# Patient Record
Sex: FEMALE | Race: WHITE | NOT HISPANIC OR LATINO | Employment: FULL TIME | URBAN - METROPOLITAN AREA
[De-identification: names, ages, dates, MRNs, and addresses within clinical notes are randomized per-mention and may not be internally consistent; named-entity substitution may affect disease eponyms.]

---

## 2017-04-19 ENCOUNTER — APPOINTMENT (OUTPATIENT)
Dept: RADIOLOGY | Facility: MEDICAL CENTER | Age: 57
End: 2017-04-19
Attending: FAMILY MEDICINE
Payer: COMMERCIAL

## 2017-05-17 ENCOUNTER — HOSPITAL ENCOUNTER (OUTPATIENT)
Dept: LAB | Facility: MEDICAL CENTER | Age: 57
End: 2017-05-17
Attending: FAMILY MEDICINE
Payer: COMMERCIAL

## 2017-05-17 LAB — TSH SERPL DL<=0.005 MIU/L-ACNC: 0.09 UIU/ML (ref 0.3–3.7)

## 2017-05-17 PROCEDURE — 84443 ASSAY THYROID STIM HORMONE: CPT

## 2017-05-17 PROCEDURE — 36415 COLL VENOUS BLD VENIPUNCTURE: CPT

## 2017-06-13 ENCOUNTER — HOSPITAL ENCOUNTER (OUTPATIENT)
Dept: RADIOLOGY | Facility: MEDICAL CENTER | Age: 57
End: 2017-06-13
Attending: FAMILY MEDICINE
Payer: COMMERCIAL

## 2017-06-13 DIAGNOSIS — Z13.9 SCREENING: ICD-10-CM

## 2017-06-13 PROCEDURE — 77063 BREAST TOMOSYNTHESIS BI: CPT

## 2017-11-15 ENCOUNTER — HOSPITAL ENCOUNTER (OUTPATIENT)
Facility: MEDICAL CENTER | Age: 57
End: 2017-11-15
Attending: OBSTETRICS & GYNECOLOGY
Payer: COMMERCIAL

## 2017-11-15 PROCEDURE — 81003 URINALYSIS AUTO W/O SCOPE: CPT

## 2017-11-15 PROCEDURE — 87086 URINE CULTURE/COLONY COUNT: CPT

## 2017-11-16 LAB
APPEARANCE UR: CLEAR
BILIRUB UR QL STRIP.AUTO: NEGATIVE
COLOR UR: YELLOW
GLUCOSE UR STRIP.AUTO-MCNC: NEGATIVE MG/DL
KETONES UR STRIP.AUTO-MCNC: NEGATIVE MG/DL
LEUKOCYTE ESTERASE UR QL STRIP.AUTO: NEGATIVE
MICRO URNS: NORMAL
NITRITE UR QL STRIP.AUTO: NEGATIVE
PH UR STRIP.AUTO: 6 [PH]
PROT UR QL STRIP: NEGATIVE MG/DL
RBC UR QL AUTO: NEGATIVE
SP GR UR STRIP.AUTO: 1.01
UROBILINOGEN UR STRIP.AUTO-MCNC: 0.2 MG/DL

## 2017-11-18 LAB
BACTERIA UR CULT: NORMAL
SIGNIFICANT IND 70042: NORMAL
SOURCE SOURCE: NORMAL

## 2018-02-08 ENCOUNTER — APPOINTMENT (RX ONLY)
Dept: URBAN - METROPOLITAN AREA CLINIC 4 | Facility: CLINIC | Age: 58
Setting detail: DERMATOLOGY
End: 2018-02-08

## 2018-02-08 DIAGNOSIS — D22 MELANOCYTIC NEVI: ICD-10-CM

## 2018-02-08 DIAGNOSIS — L82.1 OTHER SEBORRHEIC KERATOSIS: ICD-10-CM

## 2018-02-08 DIAGNOSIS — L81.4 OTHER MELANIN HYPERPIGMENTATION: ICD-10-CM

## 2018-02-08 DIAGNOSIS — D18.0 HEMANGIOMA: ICD-10-CM

## 2018-02-08 PROBLEM — D22.62 MELANOCYTIC NEVI OF LEFT UPPER LIMB, INCLUDING SHOULDER: Status: ACTIVE | Noted: 2018-02-08

## 2018-02-08 PROBLEM — E03.9 HYPOTHYROIDISM, UNSPECIFIED: Status: ACTIVE | Noted: 2018-02-08

## 2018-02-08 PROBLEM — D22.5 MELANOCYTIC NEVI OF TRUNK: Status: ACTIVE | Noted: 2018-02-08

## 2018-02-08 PROBLEM — D22.71 MELANOCYTIC NEVI OF RIGHT LOWER LIMB, INCLUDING HIP: Status: ACTIVE | Noted: 2018-02-08

## 2018-02-08 PROBLEM — D22.72 MELANOCYTIC NEVI OF LEFT LOWER LIMB, INCLUDING HIP: Status: ACTIVE | Noted: 2018-02-08

## 2018-02-08 PROBLEM — D22.61 MELANOCYTIC NEVI OF RIGHT UPPER LIMB, INCLUDING SHOULDER: Status: ACTIVE | Noted: 2018-02-08

## 2018-02-08 PROBLEM — D18.01 HEMANGIOMA OF SKIN AND SUBCUTANEOUS TISSUE: Status: ACTIVE | Noted: 2018-02-08

## 2018-02-08 PROCEDURE — 99213 OFFICE O/P EST LOW 20 MIN: CPT

## 2018-02-08 PROCEDURE — ? COUNSELING

## 2018-02-08 PROCEDURE — ? SUNSCREEN RECOMMENDATIONS

## 2018-02-08 ASSESSMENT — LOCATION ZONE DERM
LOCATION ZONE: ARM
LOCATION ZONE: TRUNK
LOCATION ZONE: LEG
LOCATION ZONE: FACE
LOCATION ZONE: HAND
LOCATION ZONE: NECK

## 2018-02-08 ASSESSMENT — LOCATION SIMPLE DESCRIPTION DERM
LOCATION SIMPLE: RIGHT LOWER BACK
LOCATION SIMPLE: RIGHT THIGH
LOCATION SIMPLE: LEFT FOREARM
LOCATION SIMPLE: LEFT HAND
LOCATION SIMPLE: LEFT POSTERIOR UPPER ARM
LOCATION SIMPLE: LEFT CHEEK
LOCATION SIMPLE: RIGHT CHEEK
LOCATION SIMPLE: LEFT UPPER BACK
LOCATION SIMPLE: UPPER BACK
LOCATION SIMPLE: LEFT THIGH
LOCATION SIMPLE: RIGHT POSTERIOR UPPER ARM
LOCATION SIMPLE: RIGHT HAND
LOCATION SIMPLE: RIGHT FOREARM
LOCATION SIMPLE: ABDOMEN
LOCATION SIMPLE: LEFT ANTERIOR NECK

## 2018-02-08 ASSESSMENT — LOCATION DETAILED DESCRIPTION DERM
LOCATION DETAILED: RIGHT DISTAL POSTERIOR UPPER ARM
LOCATION DETAILED: RIGHT RIB CAGE
LOCATION DETAILED: RIGHT RADIAL DORSAL HAND
LOCATION DETAILED: PERIUMBILICAL SKIN
LOCATION DETAILED: LEFT SUPERIOR MEDIAL UPPER BACK
LOCATION DETAILED: RIGHT SUPERIOR MEDIAL MIDBACK
LOCATION DETAILED: RIGHT CENTRAL MALAR CHEEK
LOCATION DETAILED: LEFT INFERIOR ANTERIOR NECK
LOCATION DETAILED: LEFT PROXIMAL DORSAL FOREARM
LOCATION DETAILED: RIGHT ANTERIOR PROXIMAL THIGH
LOCATION DETAILED: SUPERIOR THORACIC SPINE
LOCATION DETAILED: RIGHT PROXIMAL DORSAL FOREARM
LOCATION DETAILED: LEFT ULNAR DORSAL HAND
LOCATION DETAILED: LEFT CENTRAL MALAR CHEEK
LOCATION DETAILED: LEFT PROXIMAL POSTERIOR UPPER ARM
LOCATION DETAILED: LEFT ANTERIOR PROXIMAL THIGH

## 2018-04-30 ENCOUNTER — HOSPITAL ENCOUNTER (OUTPATIENT)
Dept: LAB | Facility: MEDICAL CENTER | Age: 58
End: 2018-04-30
Attending: FAMILY MEDICINE
Payer: COMMERCIAL

## 2018-04-30 LAB — TSH SERPL DL<=0.005 MIU/L-ACNC: 2.68 UIU/ML (ref 0.38–5.33)

## 2018-04-30 PROCEDURE — 36415 COLL VENOUS BLD VENIPUNCTURE: CPT

## 2018-04-30 PROCEDURE — 84443 ASSAY THYROID STIM HORMONE: CPT

## 2018-11-28 ENCOUNTER — HOSPITAL ENCOUNTER (OUTPATIENT)
Dept: LAB | Facility: MEDICAL CENTER | Age: 58
End: 2018-11-28
Attending: FAMILY MEDICINE
Payer: COMMERCIAL

## 2018-11-28 LAB
ALBUMIN SERPL BCP-MCNC: 4.6 G/DL (ref 3.2–4.9)
ALBUMIN/GLOB SERPL: 1.5 G/DL
ALP SERPL-CCNC: 91 U/L (ref 30–99)
ALT SERPL-CCNC: 16 U/L (ref 2–50)
ANION GAP SERPL CALC-SCNC: 9 MMOL/L (ref 0–11.9)
APPEARANCE UR: ABNORMAL
AST SERPL-CCNC: 17 U/L (ref 12–45)
BACTERIA #/AREA URNS HPF: NEGATIVE /HPF
BASOPHILS # BLD AUTO: 0.6 % (ref 0–1.8)
BASOPHILS # BLD: 0.02 K/UL (ref 0–0.12)
BILIRUB SERPL-MCNC: 0.7 MG/DL (ref 0.1–1.5)
BILIRUB UR QL STRIP.AUTO: ABNORMAL
BUN SERPL-MCNC: 15 MG/DL (ref 8–22)
CALCIUM SERPL-MCNC: 10.3 MG/DL (ref 8.5–10.5)
CHLORIDE SERPL-SCNC: 106 MMOL/L (ref 96–112)
CHOLEST SERPL-MCNC: 233 MG/DL (ref 100–199)
CO2 SERPL-SCNC: 27 MMOL/L (ref 20–33)
COLOR UR: ABNORMAL
CREAT SERPL-MCNC: 0.91 MG/DL (ref 0.5–1.4)
EOSINOPHIL # BLD AUTO: 0.08 K/UL (ref 0–0.51)
EOSINOPHIL NFR BLD: 2.3 % (ref 0–6.9)
EPI CELLS #/AREA URNS HPF: ABNORMAL /HPF
ERYTHROCYTE [DISTWIDTH] IN BLOOD BY AUTOMATED COUNT: 44.3 FL (ref 35.9–50)
FASTING STATUS PATIENT QL REPORTED: NORMAL
GLOBULIN SER CALC-MCNC: 3 G/DL (ref 1.9–3.5)
GLUCOSE SERPL-MCNC: 108 MG/DL (ref 65–99)
GLUCOSE UR STRIP.AUTO-MCNC: NEGATIVE MG/DL
HCT VFR BLD AUTO: 45.8 % (ref 37–47)
HCV AB SER QL: NEGATIVE
HDLC SERPL-MCNC: 76 MG/DL
HGB BLD-MCNC: 14.5 G/DL (ref 12–16)
HYALINE CASTS #/AREA URNS LPF: ABNORMAL /LPF
IMM GRANULOCYTES # BLD AUTO: 0.01 K/UL (ref 0–0.11)
IMM GRANULOCYTES NFR BLD AUTO: 0.3 % (ref 0–0.9)
KETONES UR STRIP.AUTO-MCNC: ABNORMAL MG/DL
LDLC SERPL CALC-MCNC: 140 MG/DL
LEUKOCYTE ESTERASE UR QL STRIP.AUTO: NEGATIVE
LYMPHOCYTES # BLD AUTO: 1.26 K/UL (ref 1–4.8)
LYMPHOCYTES NFR BLD: 35.6 % (ref 22–41)
MCH RBC QN AUTO: 29.4 PG (ref 27–33)
MCHC RBC AUTO-ENTMCNC: 31.7 G/DL (ref 33.6–35)
MCV RBC AUTO: 92.9 FL (ref 81.4–97.8)
MICRO URNS: ABNORMAL
MONOCYTES # BLD AUTO: 0.29 K/UL (ref 0–0.85)
MONOCYTES NFR BLD AUTO: 8.2 % (ref 0–13.4)
NEUTROPHILS # BLD AUTO: 1.88 K/UL (ref 2–7.15)
NEUTROPHILS NFR BLD: 53 % (ref 44–72)
NITRITE UR QL STRIP.AUTO: NEGATIVE
NRBC # BLD AUTO: 0 K/UL
NRBC BLD-RTO: 0 /100 WBC
PH UR STRIP.AUTO: 6 [PH]
PLATELET # BLD AUTO: 263 K/UL (ref 164–446)
PMV BLD AUTO: 9.8 FL (ref 9–12.9)
POTASSIUM SERPL-SCNC: 4 MMOL/L (ref 3.6–5.5)
PROT SERPL-MCNC: 7.6 G/DL (ref 6–8.2)
PROT UR QL STRIP: 100 MG/DL
RBC # BLD AUTO: 4.93 M/UL (ref 4.2–5.4)
RBC UR QL AUTO: NEGATIVE
SODIUM SERPL-SCNC: 142 MMOL/L (ref 135–145)
SP GR UR STRIP.AUTO: 1.03
TRIGL SERPL-MCNC: 87 MG/DL (ref 0–149)
TSH SERPL DL<=0.005 MIU/L-ACNC: 0.04 UIU/ML (ref 0.38–5.33)
UROBILINOGEN UR STRIP.AUTO-MCNC: 1 MG/DL
WBC # BLD AUTO: 3.5 K/UL (ref 4.8–10.8)
WBC #/AREA URNS HPF: ABNORMAL /HPF

## 2018-11-28 PROCEDURE — 86694 HERPES SIMPLEX NES ANTBDY: CPT

## 2018-11-28 PROCEDURE — 80061 LIPID PANEL: CPT

## 2018-11-28 PROCEDURE — 83036 HEMOGLOBIN GLYCOSYLATED A1C: CPT

## 2018-11-28 PROCEDURE — 36415 COLL VENOUS BLD VENIPUNCTURE: CPT

## 2018-11-28 PROCEDURE — 85025 COMPLETE CBC W/AUTO DIFF WBC: CPT

## 2018-11-28 PROCEDURE — 86803 HEPATITIS C AB TEST: CPT

## 2018-11-28 PROCEDURE — 80053 COMPREHEN METABOLIC PANEL: CPT

## 2018-11-28 PROCEDURE — 81001 URINALYSIS AUTO W/SCOPE: CPT

## 2018-11-28 PROCEDURE — 84443 ASSAY THYROID STIM HORMONE: CPT

## 2018-11-29 LAB
EST. AVERAGE GLUCOSE BLD GHB EST-MCNC: 120 MG/DL
HBA1C MFR BLD: 5.8 % (ref 0–5.6)

## 2018-12-01 LAB
HSV1+2 IGG SER IA-ACNC: >22.4 IV
HSV1+2 IGM SER IA-ACNC: 0.68 IV

## 2019-01-17 ENCOUNTER — HOSPITAL ENCOUNTER (OUTPATIENT)
Dept: LAB | Facility: MEDICAL CENTER | Age: 59
End: 2019-01-17
Attending: FAMILY MEDICINE
Payer: COMMERCIAL

## 2019-01-17 LAB — TSH SERPL DL<=0.005 MIU/L-ACNC: 0.85 UIU/ML (ref 0.38–5.33)

## 2019-01-17 PROCEDURE — 84443 ASSAY THYROID STIM HORMONE: CPT

## 2019-01-17 PROCEDURE — 36415 COLL VENOUS BLD VENIPUNCTURE: CPT

## 2019-03-27 ENCOUNTER — APPOINTMENT (RX ONLY)
Dept: URBAN - METROPOLITAN AREA CLINIC 4 | Facility: CLINIC | Age: 59
Setting detail: DERMATOLOGY
End: 2019-03-27

## 2019-03-27 DIAGNOSIS — D18.0 HEMANGIOMA: ICD-10-CM

## 2019-03-27 DIAGNOSIS — D22 MELANOCYTIC NEVI: ICD-10-CM

## 2019-03-27 DIAGNOSIS — L82.1 OTHER SEBORRHEIC KERATOSIS: ICD-10-CM

## 2019-03-27 DIAGNOSIS — L81.4 OTHER MELANIN HYPERPIGMENTATION: ICD-10-CM

## 2019-03-27 PROBLEM — D22.5 MELANOCYTIC NEVI OF TRUNK: Status: ACTIVE | Noted: 2019-03-27

## 2019-03-27 PROBLEM — D22.61 MELANOCYTIC NEVI OF RIGHT UPPER LIMB, INCLUDING SHOULDER: Status: ACTIVE | Noted: 2019-03-27

## 2019-03-27 PROBLEM — D22.72 MELANOCYTIC NEVI OF LEFT LOWER LIMB, INCLUDING HIP: Status: ACTIVE | Noted: 2019-03-27

## 2019-03-27 PROBLEM — D22.71 MELANOCYTIC NEVI OF RIGHT LOWER LIMB, INCLUDING HIP: Status: ACTIVE | Noted: 2019-03-27

## 2019-03-27 PROBLEM — D22.62 MELANOCYTIC NEVI OF LEFT UPPER LIMB, INCLUDING SHOULDER: Status: ACTIVE | Noted: 2019-03-27

## 2019-03-27 PROBLEM — D18.01 HEMANGIOMA OF SKIN AND SUBCUTANEOUS TISSUE: Status: ACTIVE | Noted: 2019-03-27

## 2019-03-27 PROCEDURE — ? COUNSELING

## 2019-03-27 PROCEDURE — 99213 OFFICE O/P EST LOW 20 MIN: CPT

## 2019-03-27 PROCEDURE — ? SUNSCREEN RECOMMENDATIONS

## 2019-03-27 ASSESSMENT — LOCATION SIMPLE DESCRIPTION DERM
LOCATION SIMPLE: RIGHT HAND
LOCATION SIMPLE: LEFT POSTERIOR UPPER ARM
LOCATION SIMPLE: UPPER BACK
LOCATION SIMPLE: LEFT CHEEK
LOCATION SIMPLE: RIGHT POSTERIOR UPPER ARM
LOCATION SIMPLE: LEFT HAND
LOCATION SIMPLE: ABDOMEN
LOCATION SIMPLE: LEFT THIGH
LOCATION SIMPLE: LEFT ANTERIOR NECK
LOCATION SIMPLE: RIGHT CHEEK
LOCATION SIMPLE: RIGHT FOREARM
LOCATION SIMPLE: LEFT FOREARM
LOCATION SIMPLE: LEFT UPPER BACK
LOCATION SIMPLE: RIGHT LOWER BACK
LOCATION SIMPLE: RIGHT THIGH

## 2019-03-27 ASSESSMENT — LOCATION DETAILED DESCRIPTION DERM
LOCATION DETAILED: RIGHT ANTERIOR PROXIMAL THIGH
LOCATION DETAILED: RIGHT DISTAL POSTERIOR UPPER ARM
LOCATION DETAILED: LEFT PROXIMAL POSTERIOR UPPER ARM
LOCATION DETAILED: LEFT SUPERIOR MEDIAL UPPER BACK
LOCATION DETAILED: RIGHT RADIAL DORSAL HAND
LOCATION DETAILED: LEFT PROXIMAL DORSAL FOREARM
LOCATION DETAILED: PERIUMBILICAL SKIN
LOCATION DETAILED: LEFT ULNAR DORSAL HAND
LOCATION DETAILED: RIGHT CENTRAL MALAR CHEEK
LOCATION DETAILED: RIGHT SUPERIOR MEDIAL MIDBACK
LOCATION DETAILED: LEFT ANTERIOR PROXIMAL THIGH
LOCATION DETAILED: SUPERIOR THORACIC SPINE
LOCATION DETAILED: RIGHT RIB CAGE
LOCATION DETAILED: RIGHT PROXIMAL DORSAL FOREARM
LOCATION DETAILED: LEFT CENTRAL MALAR CHEEK
LOCATION DETAILED: LEFT INFERIOR ANTERIOR NECK

## 2019-03-27 ASSESSMENT — LOCATION ZONE DERM
LOCATION ZONE: TRUNK
LOCATION ZONE: HAND
LOCATION ZONE: LEG
LOCATION ZONE: ARM
LOCATION ZONE: FACE
LOCATION ZONE: NECK

## 2019-05-29 ENCOUNTER — HOSPITAL ENCOUNTER (OUTPATIENT)
Dept: RADIOLOGY | Facility: MEDICAL CENTER | Age: 59
End: 2019-05-29
Attending: FAMILY MEDICINE
Payer: COMMERCIAL

## 2019-05-29 DIAGNOSIS — Z12.31 VISIT FOR SCREENING MAMMOGRAM: ICD-10-CM

## 2019-05-29 PROCEDURE — 77063 BREAST TOMOSYNTHESIS BI: CPT

## 2019-08-05 ENCOUNTER — HOSPITAL ENCOUNTER (OUTPATIENT)
Dept: LAB | Facility: MEDICAL CENTER | Age: 59
End: 2019-08-05
Attending: INTERNAL MEDICINE
Payer: COMMERCIAL

## 2019-08-05 LAB
ALBUMIN SERPL BCP-MCNC: 4.2 G/DL (ref 3.2–4.9)
ALBUMIN/GLOB SERPL: 1.6 G/DL
ALP SERPL-CCNC: 69 U/L (ref 30–99)
ALT SERPL-CCNC: 17 U/L (ref 2–50)
ANION GAP SERPL CALC-SCNC: 10 MMOL/L (ref 0–11.9)
AST SERPL-CCNC: 23 U/L (ref 12–45)
BILIRUB SERPL-MCNC: 0.4 MG/DL (ref 0.1–1.5)
BUN SERPL-MCNC: 16 MG/DL (ref 8–22)
CALCIUM SERPL-MCNC: 9 MG/DL (ref 8.5–10.5)
CHLORIDE SERPL-SCNC: 105 MMOL/L (ref 96–112)
CHOLEST SERPL-MCNC: 199 MG/DL (ref 100–199)
CO2 SERPL-SCNC: 26 MMOL/L (ref 20–33)
CREAT SERPL-MCNC: 1.02 MG/DL (ref 0.5–1.4)
GLOBULIN SER CALC-MCNC: 2.6 G/DL (ref 1.9–3.5)
GLUCOSE SERPL-MCNC: 129 MG/DL (ref 65–99)
HDLC SERPL-MCNC: 67 MG/DL
LDLC SERPL CALC-MCNC: 120 MG/DL
POTASSIUM SERPL-SCNC: 3.8 MMOL/L (ref 3.6–5.5)
PROT SERPL-MCNC: 6.8 G/DL (ref 6–8.2)
RHEUMATOID FACT SER IA-ACNC: <10 IU/ML (ref 0–14)
SODIUM SERPL-SCNC: 141 MMOL/L (ref 135–145)
T3FREE SERPL-MCNC: 2.64 PG/ML (ref 2.4–4.2)
T4 FREE SERPL-MCNC: 0.85 NG/DL (ref 0.53–1.43)
THYROPEROXIDASE AB SERPL-ACNC: 27.3 IU/ML (ref 0–9)
TRIGL SERPL-MCNC: 61 MG/DL (ref 0–149)
TSH SERPL DL<=0.005 MIU/L-ACNC: 10.35 UIU/ML (ref 0.38–5.33)

## 2019-08-05 PROCEDURE — 80061 LIPID PANEL: CPT

## 2019-08-05 PROCEDURE — 36415 COLL VENOUS BLD VENIPUNCTURE: CPT

## 2019-08-05 PROCEDURE — 86038 ANTINUCLEAR ANTIBODIES: CPT

## 2019-08-05 PROCEDURE — 84481 FREE ASSAY (FT-3): CPT

## 2019-08-05 PROCEDURE — 84443 ASSAY THYROID STIM HORMONE: CPT

## 2019-08-05 PROCEDURE — 83036 HEMOGLOBIN GLYCOSYLATED A1C: CPT

## 2019-08-05 PROCEDURE — 84439 ASSAY OF FREE THYROXINE: CPT

## 2019-08-05 PROCEDURE — 80053 COMPREHEN METABOLIC PANEL: CPT

## 2019-08-05 PROCEDURE — 86431 RHEUMATOID FACTOR QUANT: CPT

## 2019-08-05 PROCEDURE — 86376 MICROSOMAL ANTIBODY EACH: CPT

## 2019-08-06 LAB
EST. AVERAGE GLUCOSE BLD GHB EST-MCNC: 105 MG/DL
HBA1C MFR BLD: 5.3 % (ref 0–5.6)

## 2019-08-07 LAB — NUCLEAR IGG SER QL IA: NORMAL

## 2019-09-04 ENCOUNTER — OFFICE VISIT (OUTPATIENT)
Dept: CARDIOLOGY | Facility: MEDICAL CENTER | Age: 59
End: 2019-09-04
Payer: COMMERCIAL

## 2019-09-04 VITALS
HEART RATE: 70 BPM | SYSTOLIC BLOOD PRESSURE: 102 MMHG | WEIGHT: 164 LBS | BODY MASS INDEX: 26.36 KG/M2 | HEIGHT: 66 IN | DIASTOLIC BLOOD PRESSURE: 62 MMHG | OXYGEN SATURATION: 98 %

## 2019-09-04 DIAGNOSIS — Z82.49 FAMILY HISTORY OF HEART DISEASE: ICD-10-CM

## 2019-09-04 LAB — EKG IMPRESSION: NORMAL

## 2019-09-04 PROCEDURE — 99203 OFFICE O/P NEW LOW 30 MIN: CPT | Performed by: INTERNAL MEDICINE

## 2019-09-04 PROCEDURE — 93000 ELECTROCARDIOGRAM COMPLETE: CPT | Performed by: INTERNAL MEDICINE

## 2019-09-04 RX ORDER — LEVOTHYROXINE SODIUM 0.2 MG/1
TABLET ORAL
COMMUNITY
Start: 2019-06-26

## 2019-09-04 RX ORDER — LORAZEPAM 2 MG/1
TABLET ORAL
Refills: 2 | Status: ON HOLD | COMMUNITY
Start: 2019-08-12 | End: 2022-04-09

## 2019-09-04 RX ORDER — TRAZODONE HYDROCHLORIDE 50 MG/1
TABLET ORAL
COMMUNITY
Start: 2019-09-02 | End: 2022-02-16

## 2019-09-04 ASSESSMENT — ENCOUNTER SYMPTOMS
FALLS: 0
NAUSEA: 0
CHILLS: 0
ABDOMINAL PAIN: 0
NERVOUS/ANXIOUS: 1
CLAUDICATION: 0
PND: 0
FEVER: 0
SORE THROAT: 0
WEAKNESS: 0
DIZZINESS: 0
COUGH: 0
PALPITATIONS: 0
BRUISES/BLEEDS EASILY: 0
BLURRED VISION: 0
SHORTNESS OF BREATH: 0
FOCAL WEAKNESS: 0

## 2019-09-04 NOTE — PATIENT INSTRUCTIONS
Please look into the following diets and incorporate them into your diet    LOW SALT DIET   KEEP YOUR SODIUM EQUAL TO CALORIES AND NO MORE THAN DOUBLE THE CALORIES FOR A LOW SALT DIET    FOR TREATMENT OR PREVENTION OF CORONARY ARTERY DISEASE    Josh - [facility]    Dr. Oropeza's [facility] for Reversing Heart Disease - Asim Grace's Cardiologist    [facility]    Dr Moss - [place] [facility] (book and documentary)      FOR TREATMENT OF BLOOD PRESSURE  DASH DIET - American Heart Association for treatment of HYPERTENSION    FOR TREATMENT OF BAD CHOLESTEROL/FATS  REDUCE PROCESSED SUGAR AS MUCH AS POSSIBLE  INCREASE WHOLE GRAINS/VEGETABLES    Lowering total cholesterol and LDL (bad) cholesterol:  - Eat leaner cuts of meat, or eliminate altogether if possible red meat, and frequently substitute fish or chicken.  - Limit saturated fat to no more than 7-10% of total calories no more than 10 g per day is recommended. Some sources of saturated fat include butter, animal fats, hydrogenated vegetable fats and oils, many desserts, whole milk dairy products.  - Replaced saturated fats with polyunsaturated fats and monounsaturated fats. Foods high in monounsaturated fat include nuts, although well, canola oil, avocados, and olives.  - Limit trans fat (processed foods) and replaced with fresh fruits and vegetables  - Recommend nonfat dairy products  - Increase substantially the amount of soluble fiber intake (legumes such as beans, fruit, whole grains).  - Consider nutritional supplements: plant sterile spreads such as Benecol, fish oil,  flaxseed oil, omega-3 acids capsules 1000 mg twice a day, or viscous fiber such as Metamucil  - Attain ideal weight and regular exercise (at least 30 minutes per day of walking)    Lowering triglycerides:  - Reduce intake of simple sugar: Desserts, candy, pastries, honey, sodas, sugared cereals, yogurt, Gatorade, sports bars, canned  fruit, smoothies, fruit juice, coffee drinks  - Reduced intake of refined starches: Refined Pasta  - Reduce or abstain from alcohol  - Increase omega-3 fatty acids: [place], Trout, Mackerel, Herring, [place] tuna and supplements  - Attain ideal weight and regular exercise (at least 30 minutes per day of walking)      Elevating HDL (good) cholesterol:  - Increase physical activity  - Seasoned foods with garlic and onions  - Increase omega-3 fatty acids and supplements as listed above  - Incorporating appropriate amounts of monounsaturated fats such as nuts, olive oil, canola oil, avocados, olives  - Stop smoking  - Attain ideal weight and regular exercise (at least 30 minutes per day of walking)

## 2019-09-04 NOTE — PROGRESS NOTES
Chief Complaint   Patient presents with   • Arteriosclerotic Cardiovascular Disease (ASCVD)     screening       Subjective:   Chrissy Pritchett is a 59 y.o. female who presents today for evaluation of her family history of coronary disease also with chest discomfort over the last year in the setting of severe stress and PTSD    She is been healthy her life she does deal with Hashimoto's is on thyroid supplement but had significant stress last year with PTSD in that setting had some chest discomforts    Past Medical History:   Diagnosis Date   • Arthritis     right knee   • Hypothyroidism    • Stress incontinence      Past Surgical History:   Procedure Laterality Date   • BLADDER SUSPENSION  2011    Performed by ELIO AC at [facility]   • CYSTOSCOPY  2011    Performed by ELIO AC at [facility]   • HYSTERECTOMY, VAGINAL     • ARTHROSCOPY, KNEE      right x 3   • PELVISCOPY      x 5   • SHOULDER ARTHROSCOPY      right     Family History   Problem Relation Age of Onset   • Hypertension Father    • Arrythmia Father    • Heart Disease Maternal Grandmother 55     Social History     Socioeconomic History   • Marital status: Single     Spouse name: Not on file   • Number of children: Not on file   • Years of education: Not on file   • Highest education level: Not on file   Occupational History   • Not on file   Social Needs   • Financial resource strain: Not on file   • Food insecurity:     Worry: Not on file     Inability: Not on file   • Transportation needs:     Medical: Not on file     Non-medical: Not on file   Tobacco Use   • Smoking status: Former Smoker     Packs/day: 0.30     Years: 4.00     Pack years: 1.20     Last attempt to quit: 2003     Years since quittin.6   • Smokeless tobacco: Never Used   Substance and Sexual Activity   • Alcohol use: No   • Drug use: No   • Sexual activity: Not on file   Lifestyle   • Physical activity:     Days per  week: Not on file     Minutes per session: Not on file   • Stress: Not on file   Relationships   • Social connections:     Talks on phone: Not on file     Gets together: Not on file     Attends [employer] service: Not on file     Active member of club or organization: Not on file     Attends meetings of clubs or organizations: Not on file     Relationship status: Not on file   • Intimate partner violence:     Fear of current or ex partner: Not on file     Emotionally abused: Not on file     Physically abused: Not on file     Forced sexual activity: Not on file   Other Topics Concern   • Not on file   Social History Narrative   • Not on file     Allergies   Allergen Reactions   • Sagebrush      Outpatient Encounter Medications as of 9/4/2019   Medication Sig Dispense Refill   • levothyroxine (SYNTHROID) 200 MCG Tab LEVOTHYROXINE SODIUM 200 MCG TABS     • LORazepam (ATIVAN) 2 MG tablet TAKE 1 TO 3 TABS BY MOUTH EVERY DAY AS NEEDED FOR SEVERE ANXIETY. DNF 07/16, F41.1  2   • traZODone (DESYREL) 50 MG Tab      • Multiple Vitamin (MULTIVITAMIN PO) Take  by mouth every day.     • ibuprofen (MOTRIN) 200 MG Tab Take 200 mg by mouth every 6 hours as needed.     • albuterol (VENTOLIN OR PROVENTIL) 108 (90 BASE) MCG/ACT Aero Soln inhalation aerosol Inhale 2 Puffs by mouth every 6 hours as needed for Shortness of Breath. 8.5 g 1   • albuterol (PROVENTIL) 2.5mg/3ml Nebu Soln solution for nebulization 3 mL by Nebulization route every four hours as needed for Shortness of Breath. 75 mL 0   • guaifenesin-codeine (ROBITUSSIN AC) Solution oral solution Take 5 mL by mouth at bedtime as needed for Cough. 150 mL 0   • levothyroxine (SYNTHROID) 150 MCG TABS Take 200 mcg by mouth every day.     • liothyronine (CYTOMEL) 5 MCG TABS Take 5 mcg by mouth every day. Takes two      • tramadol (ULTRAM) 50 MG TABS Take  mg by mouth every four hours as needed.     • FISH OIL by Does not apply route every day.       No facility-administered  "encounter medications on file as of 9/4/2019.      Review of Systems   Constitutional: Negative for chills and fever.   HENT: Negative for sore throat.    Eyes: Negative for blurred vision.   Respiratory: Negative for cough and shortness of breath.    Cardiovascular: Negative for chest pain, palpitations, claudication, leg swelling and PND.   Gastrointestinal: Negative for abdominal pain and nausea.   Musculoskeletal: Negative for falls and joint pain.   Skin: Negative for rash.   Neurological: Negative for dizziness, focal weakness and weakness.   Endo/Heme/Allergies: Does not bruise/bleed easily.   Psychiatric/Behavioral: The patient is nervous/anxious.         Objective:   /62 (BP Location: Left arm, Patient Position: Sitting, BP Cuff Size: Adult)   Pulse 70   Ht 1.676 m (5' 6\")   Wt 74.4 kg (164 lb)   SpO2 98%   BMI 26.47 kg/m²     Physical Exam   Constitutional: No distress.   HENT:   Mouth/Throat: Oropharynx is clear and moist. No oropharyngeal exudate.   Eyes: No scleral icterus.   Neck: No JVD present.   Cardiovascular: Normal rate and normal heart sounds. Exam reveals no gallop and no friction rub.   No murmur heard.  Pulmonary/Chest: No respiratory distress. She has no wheezes. She has no rales.   Abdominal: Soft. Bowel sounds are normal.   Musculoskeletal: She exhibits no edema.   Neurological: She is alert.   Skin: No rash noted. She is not diaphoretic. There is pallor.   Psychiatric: She has a normal mood and affect.     We reviewed in person the most recent labs  Recent Results (from the past 4032 hour(s))   ESTIMATED GFR    Collection Time: 08/05/19 12:14 PM   Result Value Ref Range    GFR If African American >60 >60 mL/min/1.73 m 2    GFR If Non African American 55 (A) >60 mL/min/1.73 m 2   Comp Metabolic Panel    Collection Time: 08/05/19 12:14 PM   Result Value Ref Range    Sodium 141 135 - 145 mmol/L    Potassium 3.8 3.6 - 5.5 mmol/L    Chloride 105 96 - 112 mmol/L    Co2 26 20 - 33 " mmol/L    Anion Gap 10.0 0.0 - 11.9    Glucose 129 (H) 65 - 99 mg/dL    Bun 16 8 - 22 mg/dL    Creatinine 1.02 0.50 - 1.40 mg/dL    Calcium 9.0 8.5 - 10.5 mg/dL    AST(SGOT) 23 12 - 45 U/L    ALT(SGPT) 17 2 - 50 U/L    Alkaline Phosphatase 69 30 - 99 U/L    Total Bilirubin 0.4 0.1 - 1.5 mg/dL    Albumin 4.2 3.2 - 4.9 g/dL    Total Protein 6.8 6.0 - 8.2 g/dL    Globulin 2.6 1.9 - 3.5 g/dL    A-G Ratio 1.6 g/dL   Lipid Profile    Collection Time: 19 12:14 PM   Result Value Ref Range    Cholesterol,Tot 199 100 - 199 mg/dL    Triglycerides 61 0 - 149 mg/dL    HDL 67 >=40 mg/dL     (H) <100 mg/dL   RHEUMATOID ARTHRITIS FACTOR    Collection Time: 19 12:14 PM   Result Value Ref Range    Rheumatoid Factor -Neph- <10 0 - 14 IU/mL   TSH    Collection Time: 19 12:14 PM   Result Value Ref Range    TSH 10.350 (H) 0.380 - 5.330 uIU/mL   T3 FREE    Collection Time: 19 12:14 PM   Result Value Ref Range    T3,Free 2.64 2.40 - 4.20 pg/mL   FREE THYROXINE    Collection Time: 19 12:14 PM   Result Value Ref Range    Free T-4 0.85 0.53 - 1.43 ng/dL   THYROID PEROXIDASE  (TPO) AB    Collection Time: 19 12:14 PM   Result Value Ref Range    Microsomal -Tpo- Abs 27.3 (H) 0.0 - 9.0 IU/mL   HEMOGLOBIN A1C    Collection Time: 19 12:14 PM   Result Value Ref Range    Glycohemoglobin 5.3 0.0 - 5.6 %    Est Avg Glucose 105 mg/dL   CHRISTINA IGG DMITRY W/RFLX TO CHRISTINA IGG IFA    Collection Time: 19 12:14 PM   Result Value Ref Range    Antinuclear Antibody None Detected None Detected   EKG    Collection Time: 19  9:28 AM   Result Value Ref Range    Report       [facility]    Test Date:  2019  Pt Name:    NIKHIL VERAONNOR                 Department: [facility]  MRN:        [id number]                      Room:  Gender:     Female                       Technician: SHON  :        1960                   Requested By:KARMA JOHNSON  Order #:    [id number]                     Reading MD:    Measurements  Intervals                                Axis  Rate:       58                           P:          67  AZ:         188                          QRS:        74  QRSD:       98                           T:          31  QT:         436  QTc:        429    Interpretive Statements  SINUS BRADYCARDIA  No previous ECG available for comparison         Assessment:     1. Family history of heart disease  EKG       Medical Decision Making:  Today's Assessment / Status / Plan:     It was my pleasure to meet with Ms. Pritchett.    The 10-year ASCVD risk score (Camron HAMEED Jr., et al., 2013) is: 1.7%     We discussed how mental stress and PTSD can contribute to chest pains and so certainly treatment is advised seems that she is doing much better but fortunately she has low cardiovascular risk and does not need any further testings if she had concern about whether or not to take cholesterol agents she could certainly get a calcium score her CRP level but that they are elective and not absolutely advised    Ms. Pritchett does not require regular cardiology follow up, I have advised her to call our office or e-mail using my Izooblet if needed.    It is my pleasure to participate in the care of Ms. Pritchett.  Please do not hesitate to contact me with questions or concerns.    Handy Tompkins MD PhD FAC  Cardiologist [facility]    Please note that this dictation was created using voice recognition software. I have worked with consultants from the vendor as well as technical experts from [facility] to optimize the interface. I have made every reasonable attempt to correct obvious errors, but I expect that there are errors of grammar and possibly content I did not discover before finalizing the note.

## 2020-06-01 ENCOUNTER — APPOINTMENT (RX ONLY)
Dept: URBAN - METROPOLITAN AREA CLINIC 4 | Facility: CLINIC | Age: 60
Setting detail: DERMATOLOGY
End: 2020-06-01

## 2020-06-01 DIAGNOSIS — L82.1 OTHER SEBORRHEIC KERATOSIS: ICD-10-CM

## 2020-06-01 DIAGNOSIS — Z71.89 OTHER SPECIFIED COUNSELING: ICD-10-CM

## 2020-06-01 DIAGNOSIS — L81.4 OTHER MELANIN HYPERPIGMENTATION: ICD-10-CM

## 2020-06-01 DIAGNOSIS — D18.0 HEMANGIOMA: ICD-10-CM

## 2020-06-01 DIAGNOSIS — D22 MELANOCYTIC NEVI: ICD-10-CM

## 2020-06-01 PROBLEM — D22.5 MELANOCYTIC NEVI OF TRUNK: Status: ACTIVE | Noted: 2020-06-01

## 2020-06-01 PROBLEM — D18.01 HEMANGIOMA OF SKIN AND SUBCUTANEOUS TISSUE: Status: ACTIVE | Noted: 2020-06-01

## 2020-06-01 PROCEDURE — 99213 OFFICE O/P EST LOW 20 MIN: CPT

## 2020-06-01 PROCEDURE — ? SUNSCREEN RECOMMENDATIONS

## 2020-06-01 PROCEDURE — ? COUNSELING

## 2020-06-01 ASSESSMENT — LOCATION SIMPLE DESCRIPTION DERM
LOCATION SIMPLE: RIGHT UPPER BACK
LOCATION SIMPLE: LEFT UPPER BACK
LOCATION SIMPLE: UPPER BACK

## 2020-06-01 ASSESSMENT — LOCATION ZONE DERM: LOCATION ZONE: TRUNK

## 2020-06-01 ASSESSMENT — LOCATION DETAILED DESCRIPTION DERM
LOCATION DETAILED: RIGHT MEDIAL UPPER BACK
LOCATION DETAILED: LEFT SUPERIOR MEDIAL UPPER BACK
LOCATION DETAILED: SUPERIOR THORACIC SPINE
LOCATION DETAILED: RIGHT INFERIOR MEDIAL UPPER BACK

## 2021-03-15 DIAGNOSIS — Z23 NEED FOR VACCINATION: ICD-10-CM

## 2022-01-27 PROBLEM — M17.11 ARTHRITIS OF RIGHT KNEE: Status: ACTIVE | Noted: 2022-01-27

## 2022-04-08 ENCOUNTER — HOSPITAL ENCOUNTER (OUTPATIENT)
Facility: MEDICAL CENTER | Age: 62
End: 2022-04-11
Attending: EMERGENCY MEDICINE | Admitting: ORTHOPAEDIC SURGERY
Payer: COMMERCIAL

## 2022-04-08 DIAGNOSIS — Z96.651 S/P TKR (TOTAL KNEE REPLACEMENT), RIGHT: ICD-10-CM

## 2022-04-08 DIAGNOSIS — G89.18 POSTOPERATIVE PAIN: ICD-10-CM

## 2022-04-08 DIAGNOSIS — R41.82 ALTERED MENTAL STATUS, UNSPECIFIED ALTERED MENTAL STATUS TYPE: ICD-10-CM

## 2022-04-08 DIAGNOSIS — E87.6 HYPOKALEMIA: ICD-10-CM

## 2022-04-08 DIAGNOSIS — R41.0 CONFUSION: ICD-10-CM

## 2022-04-08 LAB
ALBUMIN SERPL BCP-MCNC: 3.5 G/DL (ref 3.2–4.9)
ALBUMIN/GLOB SERPL: 1.3 G/DL
ALP SERPL-CCNC: 68 U/L (ref 30–99)
ALT SERPL-CCNC: 17 U/L (ref 2–50)
ANION GAP SERPL CALC-SCNC: 13 MMOL/L (ref 7–16)
AST SERPL-CCNC: 18 U/L (ref 12–45)
BASOPHILS # BLD AUTO: 0.3 % (ref 0–1.8)
BASOPHILS # BLD: 0.02 K/UL (ref 0–0.12)
BILIRUB SERPL-MCNC: 0.5 MG/DL (ref 0.1–1.5)
BUN SERPL-MCNC: 8 MG/DL (ref 8–22)
CALCIUM SERPL-MCNC: 8.2 MG/DL (ref 8.5–10.5)
CHLORIDE SERPL-SCNC: 109 MMOL/L (ref 96–112)
CO2 SERPL-SCNC: 19 MMOL/L (ref 20–33)
CREAT SERPL-MCNC: 0.79 MG/DL (ref 0.5–1.4)
EOSINOPHIL # BLD AUTO: 0.02 K/UL (ref 0–0.51)
EOSINOPHIL NFR BLD: 0.3 % (ref 0–6.9)
ERYTHROCYTE [DISTWIDTH] IN BLOOD BY AUTOMATED COUNT: 43.1 FL (ref 35.9–50)
GFR SERPLBLD CREATININE-BSD FMLA CKD-EPI: 85 ML/MIN/1.73 M 2
GLOBULIN SER CALC-MCNC: 2.6 G/DL (ref 1.9–3.5)
GLUCOSE SERPL-MCNC: 99 MG/DL (ref 65–99)
HCT VFR BLD AUTO: 31.4 % (ref 37–47)
HGB BLD-MCNC: 10.5 G/DL (ref 12–16)
IMM GRANULOCYTES # BLD AUTO: 0.04 K/UL (ref 0–0.11)
IMM GRANULOCYTES NFR BLD AUTO: 0.6 % (ref 0–0.9)
LYMPHOCYTES # BLD AUTO: 1.16 K/UL (ref 1–4.8)
LYMPHOCYTES NFR BLD: 18.1 % (ref 22–41)
MCH RBC QN AUTO: 29.7 PG (ref 27–33)
MCHC RBC AUTO-ENTMCNC: 33.4 G/DL (ref 33.6–35)
MCV RBC AUTO: 89 FL (ref 81.4–97.8)
MONOCYTES # BLD AUTO: 0.67 K/UL (ref 0–0.85)
MONOCYTES NFR BLD AUTO: 10.5 % (ref 0–13.4)
NEUTROPHILS # BLD AUTO: 4.49 K/UL (ref 2–7.15)
NEUTROPHILS NFR BLD: 70.2 % (ref 44–72)
NRBC # BLD AUTO: 0 K/UL
NRBC BLD-RTO: 0 /100 WBC
PLATELET # BLD AUTO: 161 K/UL (ref 164–446)
PMV BLD AUTO: 9.9 FL (ref 9–12.9)
POTASSIUM SERPL-SCNC: 3.3 MMOL/L (ref 3.6–5.5)
PROT SERPL-MCNC: 6.1 G/DL (ref 6–8.2)
RBC # BLD AUTO: 3.53 M/UL (ref 4.2–5.4)
SODIUM SERPL-SCNC: 141 MMOL/L (ref 135–145)
WBC # BLD AUTO: 6.4 K/UL (ref 4.8–10.8)

## 2022-04-08 PROCEDURE — 700102 HCHG RX REV CODE 250 W/ 637 OVERRIDE(OP): Performed by: ORTHOPAEDIC SURGERY

## 2022-04-08 PROCEDURE — 700111 HCHG RX REV CODE 636 W/ 250 OVERRIDE (IP): Performed by: EMERGENCY MEDICINE

## 2022-04-08 PROCEDURE — 36415 COLL VENOUS BLD VENIPUNCTURE: CPT

## 2022-04-08 PROCEDURE — G0378 HOSPITAL OBSERVATION PER HR: HCPCS

## 2022-04-08 PROCEDURE — A9270 NON-COVERED ITEM OR SERVICE: HCPCS | Performed by: ORTHOPAEDIC SURGERY

## 2022-04-08 PROCEDURE — 99285 EMERGENCY DEPT VISIT HI MDM: CPT

## 2022-04-08 PROCEDURE — 700105 HCHG RX REV CODE 258: Performed by: EMERGENCY MEDICINE

## 2022-04-08 PROCEDURE — 80053 COMPREHEN METABOLIC PANEL: CPT

## 2022-04-08 PROCEDURE — 85025 COMPLETE CBC W/AUTO DIFF WBC: CPT

## 2022-04-08 PROCEDURE — 96374 THER/PROPH/DIAG INJ IV PUSH: CPT

## 2022-04-08 RX ORDER — AMOXICILLIN 250 MG
1 CAPSULE ORAL NIGHTLY
Status: DISCONTINUED | OUTPATIENT
Start: 2022-04-08 | End: 2022-04-11 | Stop reason: HOSPADM

## 2022-04-08 RX ORDER — MORPHINE SULFATE 4 MG/ML
4 INJECTION INTRAVENOUS ONCE
Status: DISCONTINUED | OUTPATIENT
Start: 2022-04-08 | End: 2022-04-08

## 2022-04-08 RX ORDER — SENNOSIDES 8.6 MG
650 CAPSULE ORAL EVERY 6 HOURS PRN
COMMUNITY

## 2022-04-08 RX ORDER — KETOROLAC TROMETHAMINE 30 MG/ML
30 INJECTION, SOLUTION INTRAMUSCULAR; INTRAVENOUS EVERY 6 HOURS
Status: DISCONTINUED | OUTPATIENT
Start: 2022-04-09 | End: 2022-04-09

## 2022-04-08 RX ORDER — HALOPERIDOL 5 MG/ML
1 INJECTION INTRAMUSCULAR EVERY 6 HOURS PRN
Status: DISCONTINUED | OUTPATIENT
Start: 2022-04-08 | End: 2022-04-11 | Stop reason: HOSPADM

## 2022-04-08 RX ORDER — DIPHENHYDRAMINE HYDROCHLORIDE 50 MG/ML
25 INJECTION INTRAMUSCULAR; INTRAVENOUS EVERY 6 HOURS PRN
Status: DISCONTINUED | OUTPATIENT
Start: 2022-04-08 | End: 2022-04-09

## 2022-04-08 RX ORDER — MORPHINE SULFATE 4 MG/ML
4 INJECTION INTRAVENOUS ONCE
Status: COMPLETED | OUTPATIENT
Start: 2022-04-08 | End: 2022-04-08

## 2022-04-08 RX ORDER — OXYCODONE HYDROCHLORIDE 10 MG/1
10 TABLET ORAL
Status: DISCONTINUED | OUTPATIENT
Start: 2022-04-08 | End: 2022-04-09

## 2022-04-08 RX ORDER — SCOLOPAMINE TRANSDERMAL SYSTEM 1 MG/1
1 PATCH, EXTENDED RELEASE TRANSDERMAL
Status: DISCONTINUED | OUTPATIENT
Start: 2022-04-08 | End: 2022-04-09

## 2022-04-08 RX ORDER — OXYCODONE HYDROCHLORIDE 5 MG/1
5 TABLET ORAL
Status: DISCONTINUED | OUTPATIENT
Start: 2022-04-08 | End: 2022-04-09

## 2022-04-08 RX ORDER — ACETAMINOPHEN 500 MG
1000 TABLET ORAL EVERY 6 HOURS PRN
Status: DISCONTINUED | OUTPATIENT
Start: 2022-04-14 | End: 2022-04-11 | Stop reason: HOSPADM

## 2022-04-08 RX ORDER — HYDROMORPHONE HYDROCHLORIDE 1 MG/ML
0.5 INJECTION, SOLUTION INTRAMUSCULAR; INTRAVENOUS; SUBCUTANEOUS
Status: DISCONTINUED | OUTPATIENT
Start: 2022-04-08 | End: 2022-04-09

## 2022-04-08 RX ORDER — IBUPROFEN 800 MG/1
800 TABLET ORAL 3 TIMES DAILY PRN
Status: DISCONTINUED | OUTPATIENT
Start: 2022-04-12 | End: 2022-04-09

## 2022-04-08 RX ORDER — POLYETHYLENE GLYCOL 3350 17 G/17G
1 POWDER, FOR SOLUTION ORAL 2 TIMES DAILY PRN
Status: DISCONTINUED | OUTPATIENT
Start: 2022-04-08 | End: 2022-04-11 | Stop reason: HOSPADM

## 2022-04-08 RX ORDER — DOCUSATE SODIUM 100 MG/1
100 CAPSULE, LIQUID FILLED ORAL 2 TIMES DAILY
Status: DISCONTINUED | OUTPATIENT
Start: 2022-04-08 | End: 2022-04-11 | Stop reason: HOSPADM

## 2022-04-08 RX ORDER — OXYCODONE HYDROCHLORIDE 5 MG/1
5 TABLET ORAL EVERY 4 HOURS PRN
COMMUNITY

## 2022-04-08 RX ORDER — ENEMA 19; 7 G/133ML; G/133ML
1 ENEMA RECTAL
Status: DISCONTINUED | OUTPATIENT
Start: 2022-04-08 | End: 2022-04-11 | Stop reason: HOSPADM

## 2022-04-08 RX ORDER — DEXAMETHASONE SODIUM PHOSPHATE 4 MG/ML
4 INJECTION, SOLUTION INTRA-ARTICULAR; INTRALESIONAL; INTRAMUSCULAR; INTRAVENOUS; SOFT TISSUE
Status: DISCONTINUED | OUTPATIENT
Start: 2022-04-08 | End: 2022-04-09

## 2022-04-08 RX ORDER — SODIUM CHLORIDE 9 MG/ML
1000 INJECTION, SOLUTION INTRAVENOUS ONCE
Status: COMPLETED | OUTPATIENT
Start: 2022-04-08 | End: 2022-04-08

## 2022-04-08 RX ORDER — AMOXICILLIN 250 MG
1 CAPSULE ORAL
Status: DISCONTINUED | OUTPATIENT
Start: 2022-04-08 | End: 2022-04-11 | Stop reason: HOSPADM

## 2022-04-08 RX ORDER — BISACODYL 10 MG
10 SUPPOSITORY, RECTAL RECTAL
Status: DISCONTINUED | OUTPATIENT
Start: 2022-04-08 | End: 2022-04-11 | Stop reason: HOSPADM

## 2022-04-08 RX ORDER — ONDANSETRON 2 MG/ML
4 INJECTION INTRAMUSCULAR; INTRAVENOUS EVERY 4 HOURS PRN
Status: DISCONTINUED | OUTPATIENT
Start: 2022-04-08 | End: 2022-04-11 | Stop reason: HOSPADM

## 2022-04-08 RX ORDER — ACETAMINOPHEN 500 MG
1000 TABLET ORAL EVERY 6 HOURS
Status: DISCONTINUED | OUTPATIENT
Start: 2022-04-09 | End: 2022-04-11 | Stop reason: HOSPADM

## 2022-04-08 RX ADMIN — OXYCODONE HYDROCHLORIDE 10 MG: 10 TABLET ORAL at 22:33

## 2022-04-08 RX ADMIN — MORPHINE SULFATE 4 MG: 4 INJECTION INTRAVENOUS at 19:41

## 2022-04-08 RX ADMIN — SODIUM CHLORIDE 1000 ML: 9 INJECTION, SOLUTION INTRAVENOUS at 19:41

## 2022-04-08 ASSESSMENT — LIFESTYLE VARIABLES
EVER HAD A DRINK FIRST THING IN THE MORNING TO STEADY YOUR NERVES TO GET RID OF A HANGOVER: NO
ON A TYPICAL DAY WHEN YOU DRINK ALCOHOL HOW MANY DRINKS DO YOU HAVE: 0
HAVE YOU EVER FELT YOU SHOULD CUT DOWN ON YOUR DRINKING: NO
TOTAL SCORE: 0
TOTAL SCORE: 0
ALCOHOL_USE: NO
CONSUMPTION TOTAL: NEGATIVE
TOTAL SCORE: 0
EVER FELT BAD OR GUILTY ABOUT YOUR DRINKING: NO
AVERAGE NUMBER OF DAYS PER WEEK YOU HAVE A DRINK CONTAINING ALCOHOL: 0
HOW MANY TIMES IN THE PAST YEAR HAVE YOU HAD 5 OR MORE DRINKS IN A DAY: 0
DOES PATIENT WANT TO STOP DRINKING: CANNOT ASSESS
HAVE PEOPLE ANNOYED YOU BY CRITICIZING YOUR DRINKING: NO

## 2022-04-08 ASSESSMENT — PATIENT HEALTH QUESTIONNAIRE - PHQ9
2. FEELING DOWN, DEPRESSED, IRRITABLE, OR HOPELESS: NOT AT ALL
1. LITTLE INTEREST OR PLEASURE IN DOING THINGS: NOT AT ALL
SUM OF ALL RESPONSES TO PHQ9 QUESTIONS 1 AND 2: 0

## 2022-04-08 ASSESSMENT — PAIN DESCRIPTION - PAIN TYPE: TYPE: ACUTE PAIN

## 2022-04-09 ENCOUNTER — APPOINTMENT (OUTPATIENT)
Dept: RADIOLOGY | Facility: MEDICAL CENTER | Age: 62
End: 2022-04-09
Attending: HOSPITALIST
Payer: COMMERCIAL

## 2022-04-09 PROBLEM — Z96.651 S/P TKR (TOTAL KNEE REPLACEMENT), RIGHT: Status: ACTIVE | Noted: 2022-04-09

## 2022-04-09 PROBLEM — R41.82 ALTERED MENTAL STATUS: Status: ACTIVE | Noted: 2022-04-09

## 2022-04-09 PROBLEM — E87.6 HYPOKALEMIA: Status: ACTIVE | Noted: 2022-04-09

## 2022-04-09 PROBLEM — R41.0 DISORIENTATION: Status: ACTIVE | Noted: 2022-04-09

## 2022-04-09 LAB
ALBUMIN SERPL BCP-MCNC: 3.9 G/DL (ref 3.2–4.9)
ALBUMIN/GLOB SERPL: 1.3 G/DL
ALP SERPL-CCNC: 82 U/L (ref 30–99)
ALT SERPL-CCNC: 17 U/L (ref 2–50)
ANION GAP SERPL CALC-SCNC: 13 MMOL/L (ref 7–16)
APPEARANCE UR: CLEAR
AST SERPL-CCNC: 12 U/L (ref 12–45)
BASOPHILS # BLD AUTO: 0.5 % (ref 0–1.8)
BASOPHILS # BLD: 0.03 K/UL (ref 0–0.12)
BILIRUB SERPL-MCNC: 0.5 MG/DL (ref 0.1–1.5)
BILIRUB UR QL STRIP.AUTO: NEGATIVE
BUN SERPL-MCNC: 9 MG/DL (ref 8–22)
CALCIUM SERPL-MCNC: 9 MG/DL (ref 8.5–10.5)
CHLORIDE SERPL-SCNC: 106 MMOL/L (ref 96–112)
CO2 SERPL-SCNC: 21 MMOL/L (ref 20–33)
COLOR UR: YELLOW
CREAT SERPL-MCNC: 0.62 MG/DL (ref 0.5–1.4)
EKG IMPRESSION: NORMAL
EOSINOPHIL # BLD AUTO: 0.01 K/UL (ref 0–0.51)
EOSINOPHIL NFR BLD: 0.2 % (ref 0–6.9)
ERYTHROCYTE [DISTWIDTH] IN BLOOD BY AUTOMATED COUNT: 43.6 FL (ref 35.9–50)
ERYTHROCYTE [SEDIMENTATION RATE] IN BLOOD BY WESTERGREN METHOD: 85 MM/HOUR (ref 0–25)
EST. AVERAGE GLUCOSE BLD GHB EST-MCNC: 100 MG/DL
GFR SERPLBLD CREATININE-BSD FMLA CKD-EPI: 101 ML/MIN/1.73 M 2
GLOBULIN SER CALC-MCNC: 3 G/DL (ref 1.9–3.5)
GLUCOSE SERPL-MCNC: 119 MG/DL (ref 65–99)
GLUCOSE UR STRIP.AUTO-MCNC: NEGATIVE MG/DL
HBA1C MFR BLD: 5.1 % (ref 4–5.6)
HCT VFR BLD AUTO: 35.9 % (ref 37–47)
HGB BLD-MCNC: 12.1 G/DL (ref 12–16)
IMM GRANULOCYTES # BLD AUTO: 0.02 K/UL (ref 0–0.11)
IMM GRANULOCYTES NFR BLD AUTO: 0.3 % (ref 0–0.9)
KETONES UR STRIP.AUTO-MCNC: 15 MG/DL
LEUKOCYTE ESTERASE UR QL STRIP.AUTO: NEGATIVE
LYMPHOCYTES # BLD AUTO: 0.82 K/UL (ref 1–4.8)
LYMPHOCYTES NFR BLD: 13.6 % (ref 22–41)
MAGNESIUM SERPL-MCNC: 2 MG/DL (ref 1.5–2.5)
MCH RBC QN AUTO: 29.7 PG (ref 27–33)
MCHC RBC AUTO-ENTMCNC: 33.7 G/DL (ref 33.6–35)
MCV RBC AUTO: 88.2 FL (ref 81.4–97.8)
MICRO URNS: ABNORMAL
MONOCYTES # BLD AUTO: 0.41 K/UL (ref 0–0.85)
MONOCYTES NFR BLD AUTO: 6.8 % (ref 0–13.4)
NEUTROPHILS # BLD AUTO: 4.75 K/UL (ref 2–7.15)
NEUTROPHILS NFR BLD: 78.6 % (ref 44–72)
NITRITE UR QL STRIP.AUTO: NEGATIVE
NRBC # BLD AUTO: 0 K/UL
NRBC BLD-RTO: 0 /100 WBC
PH UR STRIP.AUTO: 7.5 [PH] (ref 5–8)
PLATELET # BLD AUTO: 192 K/UL (ref 164–446)
PMV BLD AUTO: 9.6 FL (ref 9–12.9)
POTASSIUM SERPL-SCNC: 3.5 MMOL/L (ref 3.6–5.5)
PROT SERPL-MCNC: 6.9 G/DL (ref 6–8.2)
PROT UR QL STRIP: NEGATIVE MG/DL
RBC # BLD AUTO: 4.07 M/UL (ref 4.2–5.4)
RBC UR QL AUTO: NEGATIVE
SODIUM SERPL-SCNC: 140 MMOL/L (ref 135–145)
SP GR UR STRIP.AUTO: >=1.045
T4 FREE SERPL-MCNC: 0.7 NG/DL (ref 0.93–1.7)
TSH SERPL DL<=0.005 MIU/L-ACNC: 14.2 UIU/ML (ref 0.38–5.33)
UROBILINOGEN UR STRIP.AUTO-MCNC: 0.2 MG/DL
WBC # BLD AUTO: 6 K/UL (ref 4.8–10.8)

## 2022-04-09 PROCEDURE — 83735 ASSAY OF MAGNESIUM: CPT

## 2022-04-09 PROCEDURE — 85652 RBC SED RATE AUTOMATED: CPT

## 2022-04-09 PROCEDURE — 70496 CT ANGIOGRAPHY HEAD: CPT

## 2022-04-09 PROCEDURE — 99244 OFF/OP CNSLTJ NEW/EST MOD 40: CPT | Mod: FS | Performed by: NURSE PRACTITIONER

## 2022-04-09 PROCEDURE — G0378 HOSPITAL OBSERVATION PER HR: HCPCS

## 2022-04-09 PROCEDURE — A9270 NON-COVERED ITEM OR SERVICE: HCPCS | Performed by: HOSPITALIST

## 2022-04-09 PROCEDURE — 97161 PT EVAL LOW COMPLEX 20 MIN: CPT

## 2022-04-09 PROCEDURE — 70498 CT ANGIOGRAPHY NECK: CPT

## 2022-04-09 PROCEDURE — 93010 ELECTROCARDIOGRAM REPORT: CPT | Performed by: INTERNAL MEDICINE

## 2022-04-09 PROCEDURE — 93005 ELECTROCARDIOGRAM TRACING: CPT | Performed by: HOSPITALIST

## 2022-04-09 PROCEDURE — 84443 ASSAY THYROID STIM HORMONE: CPT

## 2022-04-09 PROCEDURE — 96372 THER/PROPH/DIAG INJ SC/IM: CPT

## 2022-04-09 PROCEDURE — 97166 OT EVAL MOD COMPLEX 45 MIN: CPT

## 2022-04-09 PROCEDURE — 81003 URINALYSIS AUTO W/O SCOPE: CPT

## 2022-04-09 PROCEDURE — 70551 MRI BRAIN STEM W/O DYE: CPT

## 2022-04-09 PROCEDURE — 99024 POSTOP FOLLOW-UP VISIT: CPT | Performed by: PHYSICIAN ASSISTANT

## 2022-04-09 PROCEDURE — 84439 ASSAY OF FREE THYROXINE: CPT

## 2022-04-09 PROCEDURE — 700102 HCHG RX REV CODE 250 W/ 637 OVERRIDE(OP): Performed by: NURSE PRACTITIONER

## 2022-04-09 PROCEDURE — 99255 IP/OBS CONSLTJ NEW/EST HI 80: CPT | Performed by: HOSPITALIST

## 2022-04-09 PROCEDURE — 96376 TX/PRO/DX INJ SAME DRUG ADON: CPT

## 2022-04-09 PROCEDURE — 700111 HCHG RX REV CODE 636 W/ 250 OVERRIDE (IP): Performed by: ORTHOPAEDIC SURGERY

## 2022-04-09 PROCEDURE — 85025 COMPLETE CBC W/AUTO DIFF WBC: CPT

## 2022-04-09 PROCEDURE — 700102 HCHG RX REV CODE 250 W/ 637 OVERRIDE(OP): Performed by: HOSPITALIST

## 2022-04-09 PROCEDURE — 92610 EVALUATE SWALLOWING FUNCTION: CPT

## 2022-04-09 PROCEDURE — 700105 HCHG RX REV CODE 258: Performed by: STUDENT IN AN ORGANIZED HEALTH CARE EDUCATION/TRAINING PROGRAM

## 2022-04-09 PROCEDURE — 700102 HCHG RX REV CODE 250 W/ 637 OVERRIDE(OP): Performed by: ORTHOPAEDIC SURGERY

## 2022-04-09 PROCEDURE — 96375 TX/PRO/DX INJ NEW DRUG ADDON: CPT

## 2022-04-09 PROCEDURE — 700117 HCHG RX CONTRAST REV CODE 255: Performed by: HOSPITALIST

## 2022-04-09 PROCEDURE — A9270 NON-COVERED ITEM OR SERVICE: HCPCS | Performed by: ORTHOPAEDIC SURGERY

## 2022-04-09 PROCEDURE — 80053 COMPREHEN METABOLIC PANEL: CPT

## 2022-04-09 PROCEDURE — 83036 HEMOGLOBIN GLYCOSYLATED A1C: CPT

## 2022-04-09 PROCEDURE — A9270 NON-COVERED ITEM OR SERVICE: HCPCS | Performed by: NURSE PRACTITIONER

## 2022-04-09 PROCEDURE — 36415 COLL VENOUS BLD VENIPUNCTURE: CPT

## 2022-04-09 RX ORDER — ASPIRIN 325 MG
325 TABLET ORAL DAILY
Status: DISCONTINUED | OUTPATIENT
Start: 2022-04-09 | End: 2022-04-09

## 2022-04-09 RX ORDER — OXYCODONE HYDROCHLORIDE 5 MG/1
5 TABLET ORAL
Status: DISCONTINUED | OUTPATIENT
Start: 2022-04-09 | End: 2022-04-11 | Stop reason: HOSPADM

## 2022-04-09 RX ORDER — SODIUM CHLORIDE 9 MG/ML
1000 INJECTION, SOLUTION INTRAVENOUS ONCE
Status: ACTIVE | OUTPATIENT
Start: 2022-04-09 | End: 2022-04-10

## 2022-04-09 RX ORDER — ASPIRIN 81 MG/1
81 TABLET, CHEWABLE ORAL DAILY
Status: DISCONTINUED | OUTPATIENT
Start: 2022-04-09 | End: 2022-04-11 | Stop reason: HOSPADM

## 2022-04-09 RX ORDER — HYDROMORPHONE HYDROCHLORIDE 1 MG/ML
0.5 INJECTION, SOLUTION INTRAMUSCULAR; INTRAVENOUS; SUBCUTANEOUS EVERY 4 HOURS PRN
Status: DISCONTINUED | OUTPATIENT
Start: 2022-04-09 | End: 2022-04-11 | Stop reason: HOSPADM

## 2022-04-09 RX ORDER — OXYCODONE HYDROCHLORIDE 10 MG/1
10 TABLET ORAL
Status: DISCONTINUED | OUTPATIENT
Start: 2022-04-09 | End: 2022-04-11 | Stop reason: HOSPADM

## 2022-04-09 RX ORDER — SODIUM CHLORIDE 9 MG/ML
INJECTION, SOLUTION INTRAVENOUS CONTINUOUS
Status: DISCONTINUED | OUTPATIENT
Start: 2022-04-09 | End: 2022-04-11 | Stop reason: HOSPADM

## 2022-04-09 RX ORDER — ASPIRIN 81 MG/1
324 TABLET, CHEWABLE ORAL DAILY
Status: DISCONTINUED | OUTPATIENT
Start: 2022-04-09 | End: 2022-04-09

## 2022-04-09 RX ORDER — ATORVASTATIN CALCIUM 80 MG/1
80 TABLET, FILM COATED ORAL EVERY EVENING
Status: DISCONTINUED | OUTPATIENT
Start: 2022-04-09 | End: 2022-04-11 | Stop reason: HOSPADM

## 2022-04-09 RX ORDER — ASPIRIN 300 MG/1
300 SUPPOSITORY RECTAL DAILY
Status: DISCONTINUED | OUTPATIENT
Start: 2022-04-09 | End: 2022-04-09

## 2022-04-09 RX ADMIN — ACETAMINOPHEN 1000 MG: 500 TABLET ORAL at 17:25

## 2022-04-09 RX ADMIN — KETOROLAC TROMETHAMINE 30 MG: 30 INJECTION, SOLUTION INTRAMUSCULAR at 06:09

## 2022-04-09 RX ADMIN — KETOROLAC TROMETHAMINE 30 MG: 30 INJECTION, SOLUTION INTRAMUSCULAR at 00:13

## 2022-04-09 RX ADMIN — DOCUSATE SODIUM 100 MG: 100 CAPSULE, LIQUID FILLED ORAL at 17:24

## 2022-04-09 RX ADMIN — ASPIRIN 81 MG 81 MG: 81 TABLET ORAL at 17:24

## 2022-04-09 RX ADMIN — IOHEXOL 100 ML: 350 INJECTION, SOLUTION INTRAVENOUS at 12:57

## 2022-04-09 RX ADMIN — ACETAMINOPHEN 1000 MG: 500 TABLET ORAL at 06:09

## 2022-04-09 RX ADMIN — ATORVASTATIN CALCIUM 80 MG: 80 TABLET, FILM COATED ORAL at 17:33

## 2022-04-09 RX ADMIN — SODIUM CHLORIDE: 9 INJECTION, SOLUTION INTRAVENOUS at 15:58

## 2022-04-09 RX ADMIN — ENOXAPARIN SODIUM 40 MG: 40 INJECTION SUBCUTANEOUS at 10:12

## 2022-04-09 RX ADMIN — ONDANSETRON 4 MG: 2 INJECTION INTRAMUSCULAR; INTRAVENOUS at 17:37

## 2022-04-09 RX ADMIN — ACETAMINOPHEN 1000 MG: 500 TABLET ORAL at 00:13

## 2022-04-09 RX ADMIN — DOCUSATE SODIUM 100 MG: 100 CAPSULE, LIQUID FILLED ORAL at 06:09

## 2022-04-09 RX ADMIN — SODIUM CHLORIDE: 9 INJECTION, SOLUTION INTRAVENOUS at 03:04

## 2022-04-09 ASSESSMENT — COGNITIVE AND FUNCTIONAL STATUS - GENERAL
DRESSING REGULAR LOWER BODY CLOTHING: A LOT
MOBILITY SCORE: 16
DRESSING REGULAR UPPER BODY CLOTHING: A LITTLE
MOVING FROM LYING ON BACK TO SITTING ON SIDE OF FLAT BED: UNABLE
TOILETING: A LITTLE
MOBILITY SCORE: 15
SUGGESTED CMS G CODE MODIFIER MOBILITY: CK
STANDING UP FROM CHAIR USING ARMS: A LITTLE
WALKING IN HOSPITAL ROOM: A LOT
HELP NEEDED FOR BATHING: A LOT
DAILY ACTIVITIY SCORE: 21
EATING MEALS: A LITTLE
WALKING IN HOSPITAL ROOM: A LITTLE
CLIMB 3 TO 5 STEPS WITH RAILING: A LOT
MOVING TO AND FROM BED TO CHAIR: A LITTLE
TOILETING: A LOT
SUGGESTED CMS G CODE MODIFIER DAILY ACTIVITY: CK
SUGGESTED CMS G CODE MODIFIER MOBILITY: CK
CLIMB 3 TO 5 STEPS WITH RAILING: A LOT
PERSONAL GROOMING: A LITTLE
STANDING UP FROM CHAIR USING ARMS: A LITTLE
TURNING FROM BACK TO SIDE WHILE IN FLAT BAD: A LITTLE
DRESSING REGULAR LOWER BODY CLOTHING: A LITTLE
SUGGESTED CMS G CODE MODIFIER DAILY ACTIVITY: CJ
MOVING TO AND FROM BED TO CHAIR: A LITTLE
HELP NEEDED FOR BATHING: A LITTLE
DAILY ACTIVITIY SCORE: 15
TURNING FROM BACK TO SIDE WHILE IN FLAT BAD: A LITTLE
MOVING FROM LYING ON BACK TO SITTING ON SIDE OF FLAT BED: A LITTLE

## 2022-04-09 ASSESSMENT — ENCOUNTER SYMPTOMS
DOUBLE VISION: 0
NAUSEA: 0
BLURRED VISION: 0
FEVER: 0
NERVOUS/ANXIOUS: 0
DIZZINESS: 0
HEADACHES: 0
SENSORY CHANGE: 0

## 2022-04-09 ASSESSMENT — GAIT ASSESSMENTS
DEVIATION: ANTALGIC;DECREASED HEEL STRIKE;DECREASED TOE OFF;BRADYKINETIC
ASSISTIVE DEVICE: FRONT WHEEL WALKER
GAIT LEVEL OF ASSIST: CONTACT GUARD ASSIST
DISTANCE (FEET): 100

## 2022-04-09 ASSESSMENT — PAIN DESCRIPTION - PAIN TYPE
TYPE: SURGICAL PAIN
TYPE: ACUTE PAIN
TYPE: ACUTE PAIN

## 2022-04-09 ASSESSMENT — ACTIVITIES OF DAILY LIVING (ADL): TOILETING: INDEPENDENT

## 2022-04-09 ASSESSMENT — FIBROSIS 4 INDEX: FIB4 SCORE: 0.76

## 2022-04-09 NOTE — THERAPY
"Physical Therapy   Initial Evaluation     Patient Name: Chrissy Pritchett  Age:  61 y.o., Sex:  female  Medical Record #: [id number]  Today's Date: 4/9/2022          Assessment    Patient is 61 y.o. female with admitting diagnosis of AMS/confusion and unmanageable post op pain following R TKA 4/6. PMHx includes  hypothyroidism. Pt demonstrates bed mobility and sit to stand transfers with SBA, CGA and verbal cues for technique during ambulation with FWW in room. Review of TKA HEP with pt's daughter as pt is unable to retain education at this time. Pt's primarily limitations at this time are cognitive, pt does not require further acute PT services at this time, is appropriate for DC home with family support and home health PT when medically cleared.       Plan    Recommend Physical Therapy for Evaluation only     DC Equipment Recommendations: (P) Unable to determine at this time  Discharge Recommendations: (P) Recommend home health for continued physical therapy services       Subjective    \"I don't know...\"     04/09/22 0940   Initial Contact Note    Initial Contact Note Order Received and Verified, Evaluation Only - Patient Does Not Require Further Acute Physical Therapy at this Time.  However, May Benefit from Post Acute Therapy for Higher Level Functional Deficits.   Vitals   O2 (LPM) 0   O2 Delivery Device None - Room Air   Pain 0 - 10 Group   Location Knee   Therapist Pain Assessment 0;Post Activity Pain Same as Prior to Activity   Prior Living Situation   Prior Services Home-Independent   Housing / Facility 2 Story House   Steps In Home 15   Bathroom Set up Walk In Shower;Shower Chair   Equipment Owned Tub / Shower Seat;Front-Wheel Walker   Lives with - Patient's Self Care Capacity Parents   Comments pt's daughter reports pt recently moved in with father and mother in law, pt was to be caregiver for them both   Prior Level of Functional Mobility   Bed Mobility Independent   Transfer Status Independent   Ambulation " Independent   Assistive Devices Used None   Stairs Independent   History of Falls   History of Falls No   Cognition    Cognition / Consciousness X   Orientation Level Not Oriented to Day;Not Oriented to Place;Not Oriented to Reason   Level of Consciousness Alert   Ability To Follow Commands 1 Step   New Learning Impaired   Attention Impaired   Comments pt aware of cognitive deficts, unable to answer any questions regarding PLOF or living situation   Strength Lower Body   Lower Body Strength  X   Comments R LE limited s/p R TKA   Strength Upper Body   Upper Body Strength  WDL   Gait Analysis   Gait Level Of Assist Contact Guard Assist   Assistive Device Front Wheel Walker   Distance (Feet) 100   # of Times Distance was Traveled 1   Deviation Antalgic;Decreased Heel Strike;Decreased Toe Off;Bradykinetic   Weight Bearing Status no restrictions   Bed Mobility    Supine to Sit Standby Assist   Functional Mobility   Sit to Stand Standby Assist   Bed, Chair, Wheelchair Transfer Standby Assist   Mobility bed mobility, sit to stand at EOB, ambulation with FWW   How much difficulty does the patient currently have...   Turning over in bed (including adjusting bedclothes, sheets and blankets)? 3   Sitting down on and standing up from a chair with arms (e.g., wheelchair, bedside commode, etc.) 1   Moving from lying on back to sitting on the side of the bed? 3   How much help from another person does the patient currently need...   Moving to and from a bed to a chair (including a wheelchair)? 3   Need to walk in a hospital room? 3   Climbing 3-5 steps with a railing? 2   6 clicks Mobility Score 15   Activity Tolerance   Sitting in Chair 10 min + left in chair   Sitting Edge of Bed 5 min   Standing 5 min   Edema / Skin Assessment   Edema / Skin  X   Comments R TKA incision healing well; no signs or symptoms of infection   Education Group   Education Provided Role of Physical Therapist;Gait Training;Transfer Status;Exercises -  Seated;Exercises - Supine   Role of Physical Therapist Patient Response Patient;Family;Acceptance;Explanation;Verbal Demonstration   Gait Training Patient Response Patient;Family;Acceptance;Explanation;Verbal Demonstration;Reinforcement Needed;Action Demonstration   Exercises - Supine Patient Response Family;Acceptance;Explanation;Handout;Demonstration;Verbal Demonstration   Exercises - Seated Patient Response Family;Acceptance;Explanation;Demonstration;Handout;Verbal Demonstration   Transfer Status Patient Response Patient;Family;Acceptance;Explanation;Verbal Demonstration;Action Demonstration;Demonstration   Problem List    Problems Impaired Transfers;Impaired Ambulation;Functional ROM Deficit;Impaired Balance;Safety Awareness Deficits / Cognition   Anticipated Discharge Equipment and Recommendations   DC Equipment Recommendations Unable to determine at this time   Discharge Recommendations Recommend home health for continued physical therapy services       Tasia Sanchez DPT

## 2022-04-09 NOTE — PROGRESS NOTES
Orthopedic progress note    S:  Patient was readmitted overnight secondary to dehydration and some altered mental status s/p right total knee arthroplasty with last week.  I had called this patient yesterday for evaluation and she came to the emergency room as she was having trouble controlling her pain and was having some altered mental status.  She was admitted for overnight evaluation and hydration.  She is now much more alert.  Her pain is under control.  She is not having any fevers or chills.  No chest pain, headache, shortness of breath.  She answers questions appropriately and wants to go home.    All vital signs have remained stable overnight    Hemoglobin and hematocrit-10.5/31.4  Creatinine 0.79        Exam:    NAD A& O x3, responds and answers questions appropriately  Breathing nonlabored  RLE: +DF/PF/EHL SILT L4/L5/S1, soft calf compartments without signs of DVT.  Leida dressing over right knee is intact with good suction and no significant output.  LLE:  +DF/PF/EHL SILT L4/L5/S1 soft calf compartments without signs of DVT.    Impression    1) readmission for altered mental status and dehydration status post right total knee arthroplasty on 4/6/2022 by Dr. Sidhu    Plan:      1) patient's mental status is back to baseline and she is answering questions appropriately.  Her pain is well controlled.  She would like to go home today if possible and I do think that is a reasonable request.  2) patient will keep her leida dressing in place until her follow-up appointment.  3) patient has all of her postoperative pain medication at home.  She can call our office if she needs refills.  4) she can be weightbearing as tolerated with a walker.  5) she can shower with the incision covered as needed.   6) call our office with any further questions or concerns.

## 2022-04-09 NOTE — PROGRESS NOTES
0393 Report received from José KINNEY.     6745 Patient arrived to T302 via gurney. Patient is A&Ox1 and 97% on room air. VSS. Amol Denton and Hanh at bedside. Admission profile completed with Hanh. Questions answered. Call light and belongings at bedside. Bed locked and in lowest position. Bed alarm on.     04/09/22 at 0134 Page sent out to [facility] regarding orders for fluids. New orders received from Pramjit JORDAN via telephone order with read back.

## 2022-04-09 NOTE — DISCHARGE SUMMARY
Chrissy Pritchett was admitted on 4/8/2022 for Postoperative pain [G89.18] patient had knee replacement last week.  She was readmitted for dehydration and some altered mental status  She was    Hospital course: Patient was admitted overnight for hydration and evaluation.  She has since returned to baseline and wants to go home.  Her vitals and labs have all remained stable.  Her pain is well controlled and she feels much better after getting some IV fluids.  She has all of her postoperative pain medications at home including aspirin for DVT prophylaxis.  And she will remain on that medication.    Please see progress note for today's evaluation.    Discharge date: 4/9/2022    Patient is being discharged to home.  Allergies:  [place]       Medication List      CONTINUE taking these medications      Instructions   acetaminophen 650 MG CR tablet  Commonly known as: TYLENOL   Take 650 mg by mouth every 6 hours as needed. Indications: Pain  Dose: 650 mg     aspirin 81 MG EC tablet   Take 1 Tablet by mouth 2 times a day for 30 days.  Dose: 81 mg     buPROPion 200 MG SR tablet  Commonly known as: WELLBUTRIN SR   Take 200 mg by mouth 2 times a day.  Dose: 200 mg     docusate sodium 100 MG Caps  Commonly known as: Colace   Take 1 Capsule by mouth 2 times a day for 30 days.  Dose: 100 mg     levothyroxine 200 MCG Tabs  Commonly known as: SYNTHROID   LEVOTHYROXINE SODIUM 200 MCG TABS     meloxicam 7.5 MG Tabs  Commonly known as: MOBIC   Take 1 Tablet by mouth 2 times a day for 30 days.  Dose: 7.5 mg     MULTIVITAMIN PO   Take  by mouth every day.     ondansetron 4 MG Tabs tablet  Commonly known as: Zofran   Take 1 Tablet by mouth every four hours as needed for Nausea/Vomiting for up to 30 days.  Dose: 4 mg     oxyCODONE immediate-release 5 MG Tabs  Commonly known as: ROXICODONE   Take 5 mg by mouth every four hours as needed for Severe Pain.  Dose: 5 mg     traMADol 50 MG Tabs  Commonly known as: ULTRAM   Take 1 Tablet by  mouth every 6 hours as needed for Severe Pain (ALTERNATING WITH OXYCODONE) for up to 10 days.  Dose: 50 mg        STOP taking these medications    LORazepam 2 MG tablet  Commonly known as: ATIVAN            Discharge Instructions:     Patient is instructed to ambulate and weight bear as tolerated with the use of an assistive device, and to continue physical therapy exercises given during this hospital stay. Strict posterior hip precautions are to be observed for patients who underwent a total hip replacement.  I have reassured the patient that she should maintain a level of hydration and nutrition in order to avoid dehydration status and further issues of this sort.  I have asked her to regularly take her pain medication in order to manage this.  I do think she should hold the Ativan she uses for anxiety as this may have been a cause of her confusion if she was continuing to take this.  I have had a long discussion with the patient and the patient's RN about going home today and there happy with this plan.  Patient is to ice and elevate the surgical leg regularly, with pillows under the ankle, nothing is to be placed under the knee.   Patient was given detailed wound care instructions, and will leave the Incisional vac or silver dressing on until first post-op visit.   Aspirin 81 mg twice daily for DVT prophylaxis.  Patient has all postoperative prescriptions at home  Patient is to follow up with Dr. Sidhu's office in 1-2 weeks.

## 2022-04-09 NOTE — ED TRIAGE NOTES
Pt presents to ED via EMS with complaint of right knee pain after knee surgery 4/6. Pt awake, speaking without signs of distress. EMS reports pt has been forgetting she has taken pain medication and is taking additional. EMS admin 100mcg Fentanyl during transport.

## 2022-04-09 NOTE — PROGRESS NOTES
FARHAD Salazar at bedside, updated this RN that patient can discharge today. Upon this RN's assessment of patient, patient only able to recall name, unable to answer any orientation questions. Patient does answer yes/no questions appropriately. Patient unable to follow commands of touching finger to nose. This RN updated FARHAD Salazar of this and also updated charge nurse, Darlene, of concern of patient discharging.

## 2022-04-09 NOTE — CONSULTS
[facility] Medicine Consultation    Date of Service  4/9/2022    Referring Physician  Clovis Larson M.D.    Consulting Physician  Diego Diamond D.O.    Reason for Consultation  Episodes of Altered mentation     History of Presenting Illness  Chrissy Pritchett is a 61 y.o. female who presented 4/8/2022 with scheduled surgery for right knee pain due to traumatic osteoarthritic changes.  On 4/6 she had right knee replacement by Dr Sidhu.  She was discharged and had her first physical therapy appointment 4/7 outpatient and her daughter noted an abrupt change in her mom and thought it was due to her pain block wearing off.  She states that since that time she has not been the same.  She has not been eating/drinking and not answering questions she normally readily can.  Per nurse the patient seemed to pocket her medication from this morning.      On my exam the patient looks physically normal on first appearance.  She was able to answer some basic questions with yes/no.  She was unable to tell me where she lives or what state she was in.  She was unable to follow fine motor skills of touching each finger to her thumb and appeared confused.  She could not show me three fingers on her right hand nor one finger on her left hand.  She has not received any pain medications from what I saw on MAR today.      Review of Systems  Review of Systems   Unable to perform ROS: Mental acuity   Constitutional: Negative for fever.   Eyes: Negative for blurred vision and double vision.   Gastrointestinal: Negative for nausea.   Musculoskeletal: Negative for joint pain.   Neurological: Negative for dizziness, sensory change and headaches.   Psychiatric/Behavioral: The patient is not nervous/anxious.        Past Medical History   has a past medical history of PTSD, Arthritis, Hypothyroidism, and Stress incontinence.    Surgical History   has a past surgical history that includes hysterectomy, vaginal (1998); pelviscopy; arthroscopy, knee;  shoulder arthroscopy; bladder suspension (5/27/2011); and cystoscopy (5/27/2011).    Family History  family history includes Arrythmia in her father; Heart Disease (age of onset: 55) in her maternal grandmother; Hypertension in her father.    Social History   History obtained from prior records and daughter: reports that she quit smoking about 19 years ago. She has a 1.20 pack-year smoking history. She has never used smokeless tobacco. She reports that she does not drink alcohol and does not use drugs. 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  She is  and has children.    Medications  Prior to Admission Medications   Prescriptions Last Dose Informant Patient Reported? Taking?   LORazepam (ATIVAN) 2 MG tablet >1 week at PM Family Member Yes No   Sig: TAKE 1 TO 3 TABS BY MOUTH EVERY DAY AS NEEDED FOR SEVERE ANXIETY. DNF 07/16, F41.1   Multiple Vitamin (MULTIVITAMIN PO) 4/5/2022 at AM Family Member Yes No   Sig: Take  by mouth every day.   acetaminophen (TYLENOL) 650 MG CR tablet 4/8/2022 at 1100 Family Member Yes Yes   Sig: Take 650 mg by mouth every 6 hours as needed. Indications: Pain   aspirin 81 MG EC tablet 4/7/2022 at AM Family Member No No   Sig: Take 1 Tablet by mouth 2 times a day for 30 days.   buPROPion (WELLBUTRIN SR) 200 MG SR tablet 4/7/2022 at AM Family Member Yes No   Sig: Take 200 mg by mouth 2 times a day.   docusate sodium (COLACE) 100 MG Cap 4/7/2022 at AM Family Member No No   Sig: Take 1 Capsule by mouth 2 times a day for 30 days.   levothyroxine (SYNTHROID) 200 MCG Tab 4/7/2022 at AM Family Member Yes No   Sig: LEVOTHYROXINE SODIUM 200 MCG TABS   meloxicam (MOBIC) 7.5 MG Tab 4/8/2022 at AM Family Member No No   Sig: Take 1 Tablet by mouth 2 times a day for 30 days.   ondansetron (ZOFRAN) 4 MG Tab tablet 4/8/2022 at 1500 Family Member No No   Sig: Take 1 Tablet by mouth every four hours as needed for Nausea/Vomiting for up to 30 days.    oxyCODONE immediate-release (ROXICODONE) 5 MG Tab 4/8/2022 at AM Family Member Yes Yes   Sig: Take 5 mg by mouth every four hours as needed for Severe Pain.   traMADol (ULTRAM) 50 MG Tab Not picked up at Not picked up Family Member No No   Sig: Take 1 Tablet by mouth every 6 hours as needed for Severe Pain (ALTERNATING WITH OXYCODONE) for up to 10 days.      Facility-Administered Medications: None       Allergies  Allergies   Allergen Reactions   • Sagebrush        Physical Exam  Temp:  [36.3 °C (97.4 °F)-37.5 °C (99.5 °F)] 36.3 °C (97.4 °F)  Pulse:  [74-88] 76  Resp:  [14-17] 17  BP: (117-137)/(63-77) 125/74  SpO2:  [94 %-99 %] 98 %    Physical Exam  Vitals reviewed.   Constitutional:       Appearance: Normal appearance. She is not diaphoretic.   HENT:      Head: Normocephalic and atraumatic.      Nose: Nose normal.      Mouth/Throat:      Mouth: Mucous membranes are moist.      Pharynx: No oropharyngeal exudate.   Eyes:      General: No scleral icterus.        Right eye: No discharge.         Left eye: No discharge.      Extraocular Movements: Extraocular movements intact.      Conjunctiva/sclera: Conjunctivae normal.   Cardiovascular:      Rate and Rhythm: Normal rate and regular rhythm.      Pulses:           Radial pulses are 2+ on the right side and 2+ on the left side.        Dorsalis pedis pulses are 2+ on the right side and 2+ on the left side.      Heart sounds: No murmur heard.  Pulmonary:      Effort: Pulmonary effort is normal. No respiratory distress.      Breath sounds: Normal breath sounds. No wheezing or rales.   Abdominal:      General: Bowel sounds are normal. There is no distension.      Palpations: Abdomen is soft.   Musculoskeletal:         General: No swelling or tenderness.      Cervical back: No tenderness. No muscular tenderness.      Comments: Right knee swelling.  Clean dressing over anterior right knee.  No erythema.   Lymphadenopathy:      Cervical: No cervical adenopathy.   Skin:      Coloration: Skin is not jaundiced or pale.   Neurological:      Mental Status: She is alert. She is confused.      Cranial Nerves: No cranial nerve deficit.      Sensory: No sensory deficit.      Motor: No weakness.      Coordination: Coordination abnormal.      Comments: Unable to tell me where she lives.  Unable to tell me which state she is currently in.  Unable to complete full sentence.  Yes/No answers.  Unable to follow simple commands.   Psychiatric:         Attention and Perception: Attention normal.         Mood and Affect: Mood normal.         Speech: Speech normal.         Behavior: Behavior is cooperative.         Cognition and Memory: Cognition is impaired.         Fluids      Laboratory  Recent Labs     04/08/22 1945   WBC 6.4   RBC 3.53*   HEMOGLOBIN 10.5*   HEMATOCRIT 31.4*   MCV 89.0   MCH 29.7   MCHC 33.4*   RDW 43.1   PLATELETCT 161*   MPV 9.9     Recent Labs     04/08/22 1945   SODIUM 141   POTASSIUM 3.3*   CHLORIDE 109   CO2 19*   GLUCOSE 99   BUN 8   CREATININE 0.79   CALCIUM 8.2*                     Imaging  CT-CTA HEAD WITH & W/O-POST PROCESS    (Results Pending)   CT-CTA NECK WITH & W/O-POST PROCESSING    (Results Pending)   MR-BRAIN-W/O    (Results Pending)   EC-ECHOCARDIOGRAM COMPLETE W/O CONT    (Results Pending)       Assessment/Plan  * Postoperative pain- (present on admission)  Assessment & Plan  She has not received any pain medications 4/9am.  No current grimace.     S/P TKR (total knee replacement), right  Assessment & Plan  Mobility as per PT/OT and ortho  DVT prophylaxis if CT head shows no acute hemorrhage.  Pain control as needed    Altered mental status  Assessment & Plan  Concern of expressive aphasia.  Able to answer some yes/no questions but unable to talk in full sentences and not able to tell me where she lives.  Daughter states this occurred Thursday ~1500 after physical therapy.    Patient unable to follow commands readily she could not show me three fingers on her  right hand or left.  I have consulted Neurology for a concern of stroke.  A CT head/neck and MRI brain has been ordered  Stroke protocol initiated  Seizure possible but doubtful  Check UA, TSH, CBC, CMP.    I have updated the daughter, Hanh.    Hypothyroidism- (present on admission)  Assessment & Plan  Check TSH  Last TSH:10.3 three years ago  On synthroid 200mcg daily. (hold until swallow eval)

## 2022-04-09 NOTE — ED NOTES
"Pt awake, sitting upright in bed, speaking without signs of distress. Pt daughter at bedside. States pt \"block\" wore off yesterday am and pain has been increasing since. Daughter states she has attempted to contact [facility] for additional pain management without success.   "

## 2022-04-09 NOTE — CONSULTS
"Neurology STROKE  H&P  [facility] Acute Neurology    Referring Physician: Clovis Larson M.D.      Chief Complaint   Patient presents with   • Leg Pain     R knee surg 4/6. Increased pain.       HPI: Chrissy Pritchett is a 61 y.o. female with history of osteoarthritis, hypothyroidism, prior DVT, and stress incontinence who presented to the hospital on 4/8/2022 for increased postoperative right knee pain and altered mental status. The patient recently underwent right total knee arthroplasty with orthopaedic surgery on 4/6/2022 outpatient and was sent home with physical therapy. On 4/8/2022 at 16:00, the patient's daughters notified the orthopaedic PA of the patient having difficulty managing her postoperative pain, and she appeared \"restless and groggy,\" with\"quite a bit of confusion.\" The ortho PA recommended they call 911 and they presented with the patient to [facility] ER. EMS reported the patient was medicated with Fentanyl 100 mcg with moderate alleviation. Additionally, the patient has taking Oxycodone 5 mg (every 4 hours), Meloxicam 7.5 mg (twice a day), and Tylenol 650 mg (intermittently) with minimal alleviation. The hospitalist was consulted today given the patient's persistent disorientation and speech difficulties. CTA head and neck wo contrast and MRI brain wo contrast ordered STAT. Neurology was consulted today for possible stroke.     Currently the patient is sitting up in bed, awake, alert, following commands, but having difficulty and halting when expressing herself with loss of fluency conversationally, oriented to self only, but able to name, repeat, and comprehend. NIHSS is currently 3. Denies headache, vision changes, facial or focal weakness, slurred speech, ataxia, or abnormal movement.     Review of systems: In addition to what is detailed in the HPI above, all other systems reviewed and are negative.    Past Medical History:    has a past medical history of Arthritis, Hypothyroidism, and Stress " incontinence.    FHx:  family history includes Arrythmia in her father; Heart Disease (age of onset: 55) in her maternal grandmother; Hypertension in her father.    SHx:   reports that she quit smoking about 19 years ago. She has a 1.20 pack-year smoking history. She has never used smokeless tobacco. She reports that she does not drink alcohol and does not use drugs.    Allergies:  Allergies   Allergen Reactions   • Sagebrush        Medications:    Current Facility-Administered Medications:   •  NS (BOLUS) infusion 1,000 mL, 1,000 mL, Intravenous, Once, Parmjit Jones, P.A.-C.  •  NS infusion, , Intravenous, Continuous, Parmjit Jones, P.A.-C., Last Rate: 100 mL/hr at 04/09/22 0304, New Bag at 04/09/22 0304  •  Allow for permissive hypertension: SBP up to 220 mmHg/DBP to 120 mmHg; If SBP greater than 220 mmHg or DBP greater than 120 mmHg administer PRN antihypertensive medications., , Other, PHARMACY TO DOSE, Diego Diamond D.O.  •  atorvastatin (LIPITOR) tablet 80 mg, 80 mg, Oral, Q EVENING, Diego Diamond D.O.  •  aspirin (ASA) tablet 325 mg, 325 mg, Oral, DAILY **OR** aspirin (ASA) chewable tab 324 mg, 324 mg, Oral, DAILY **OR** aspirin (ASA) suppository 300 mg, 300 mg, Rectal, DAILY, Diego Diamond D.O.  •  Pharmacy Consult Request ...Pain Management Review 1 Each, 1 Each, Other, PHARMACY TO DOSE, Clovis Larson M.D.  •  ondansetron (ZOFRAN) syringe/vial injection 4 mg, 4 mg, Intravenous, Q4HRS PRN, Clovis Larson M.D.  •  dexamethasone (DECADRON) injection 4 mg, 4 mg, Intravenous, Once PRN, Clovis Larson M.D.  •  diphenhydrAMINE (BENADRYL) injection 25 mg, 25 mg, Intravenous, Q6HRS PRN, Clovis Larson M.D.  •  haloperidol lactate (HALDOL) injection 1 mg, 1 mg, Intravenous, Q6HRS PRN, Clovis Larson M.D.  •  scopolamine (TRANSDERM-SCOP) patch 1 Patch, 1 Patch, Transdermal, Q72HRS PRN, Clovis Larson M.D.  •  docusate sodium (COLACE) capsule 100 mg, 100 mg, Oral, BID, Peter L Althausen,  M.D., 100 mg at 04/09/22 0609  •  senna-docusate (PERICOLACE or SENOKOT S) 8.6-50 MG per tablet 1 Tablet, 1 Tablet, Oral, Nightly, Clovis Larson M.D.  •  senna-docusate (PERICOLACE or SENOKOT S) 8.6-50 MG per tablet 1 Tablet, 1 Tablet, Oral, Q24HRS PRN, Clovis Larson M.D.  •  polyethylene glycol/lytes (MIRALAX) PACKET 1 Packet, 1 Packet, Oral, BID PRN, Clovis Larson M.D.  •  magnesium hydroxide (MILK OF MAGNESIA) suspension 30 mL, 30 mL, Oral, QDAY PRN, Clovis Larson M.D.  •  bisacodyl (DULCOLAX) suppository 10 mg, 10 mg, Rectal, Q24HRS PRN, Clovis Larson M.D.  •  sodium phosphate (Fleet) enema 133 mL, 1 Each, Rectal, Once PRN, Clovis Larson M.D.  •  enoxaparin (LOVENOX) inj 40 mg, 40 mg, Subcutaneous, QDAY, Clovis Larson M.D., 40 mg at 04/09/22 1012  •  acetaminophen (TYLENOL) tablet 1,000 mg, 1,000 mg, Oral, Q6HRS, 1,000 mg at 04/09/22 0609 **FOLLOWED BY** [START ON 4/14/2022] acetaminophen (TYLENOL) tablet 1,000 mg, 1,000 mg, Oral, Q6HRS PRN, Clovis Larson M.D.  •  oxyCODONE immediate-release (ROXICODONE) tablet 5 mg, 5 mg, Oral, Q3HRS PRN **OR** oxyCODONE immediate release (ROXICODONE) tablet 10 mg, 10 mg, Oral, Q3HRS PRN, 10 mg at 04/08/22 2233 **OR** HYDROmorphone (Dilaudid) injection 0.5 mg, 0.5 mg, Intravenous, Q3HRS PRN, Clovis Larson M.D.    Physical Examination:    Vitals:    04/08/22 2200 04/09/22 0000 04/09/22 0609 04/09/22 0712   BP: 124/65 126/74 127/75 125/74   Pulse: 85 74 88 76   Resp:  17 16 17   Temp:  36.9 °C (98.5 °F) 36.8 °C (98.2 °F) 36.3 °C (97.4 °F)   TempSrc:  Temporal Temporal Temporal   SpO2: 95% 97% 94% 98%   Weight:       Height:           General: Patient is awake and in no acute distress  Eyes: Examination of optic disks not indicated at this time given acuity of consult.  CV: RRR    NEUROLOGICAL EXAM:     Mental status: Awake, alert, oriented to self only with limited speech, and follows commands.  Speech and language: Speech is limited  conversationally with halting and loss of fluency. However, the patient is able to name, repeat, and comprehend.  Cranial nerve exam: Pupils are equal, round and reactive to light bilaterally. Visual fields are full. Extraocular muscles are intact without nystagmus. Sensation in the face is intact to light touch. Face is symmetric. Hearing to finger rub equal. Uvula midline. Palate elevates symmetrically. Shoulder shrug is full. Tongue is midline without fasciculations or signs of biting.  Motor exam: Strength is antigravity in all extremities without drift appreciated, some pain limitation with edema to RLE as expected post-operative TKA. Tone is normal. No abnormal movements were seen on exam.  Sensory exam: No sensory deficits identified   Deep tendon reflexes:  Toes down-going bilaterally.  Coordination: No ataxia with a normal finger-nose-finger. Normal rapidly alternating movements.   Gait: Deferred per patient preference.     NIH Stroke Scale:    1a. Level of Consciousness (Alert, drowsy, etc): 0= Alert    1b. LOC Questions (Month, age): 2= Incorrect    1c. LOC Commands (Open/close eyes make fist/let go): 0= Obeys both correctly    2.   Best Gaze (Eyes open - patient follows examiner's finger on face): 0= Normal    3.   Visual Fields (introduce visual stimulus/threat to patient's field quadrants): 0= No visual loss  4.   Facial Paresis (Show teeth, raise eyebrows and squeeze eyes shut): 0= Normal     5a. Motor Arm - Left (Elevate arm to 90 degrees if patient is sitting, 45 degrees if  supine): 0= No drift    5b. Motor Arm - Right (Elevate arm to 90 degrees if patient is sitting, 45 degrees if supine): 0= No drift    6a. Motor Leg - Left (Elevate leg 30 degrees with patient supine): 0= No drift    6b. Motor Leg - Right  (Elevate leg 30 degrees with patient supine): 0= No drift    7.   Limb Ataxia (Finger-nose, heel down shin): 0= No ataxia    8.   Sensory (Pin prick to face, arm, trunk and leg - compare side  to side): 0= Normal    9.  Best Language (Name item, describe a picture and read sentences): 1= Mild to moderate aphasia    10. Dysarthria (Evaluate speech clarity by patient repeating listed words): 0= Normal articulation    11. Extinction and Inattention (Use information from prior testing to identify neglect or  double simultaneous stimuli testing): 0= No neglect    Total NIH Score: 3      Modified [place] Scale (MRS): 1 = No significant disability, despite symptoms; able to perform all usual duties and activities      Objective Data:    Labs:  No results found for: PROTHROMBTM, INR   Lab Results   Component Value Date/Time    WBC 6.0 04/09/2022 11:13 AM    RBC 4.07 (L) 04/09/2022 11:13 AM    HEMOGLOBIN 12.1 04/09/2022 11:13 AM    HEMATOCRIT 35.9 (L) 04/09/2022 11:13 AM    MCV 88.2 04/09/2022 11:13 AM    MCH 29.7 04/09/2022 11:13 AM    MCHC 33.7 04/09/2022 11:13 AM    MPV 9.6 04/09/2022 11:13 AM    NEUTSPOLYS 78.60 (H) 04/09/2022 11:13 AM    LYMPHOCYTES 13.60 (L) 04/09/2022 11:13 AM    MONOCYTES 6.80 04/09/2022 11:13 AM    EOSINOPHILS 0.20 04/09/2022 11:13 AM    BASOPHILS 0.50 04/09/2022 11:13 AM      Lab Results   Component Value Date/Time    SODIUM 140 04/09/2022 11:13 AM    POTASSIUM 3.5 (L) 04/09/2022 11:13 AM    CHLORIDE 106 04/09/2022 11:13 AM    CO2 21 04/09/2022 11:13 AM    GLUCOSE 119 (H) 04/09/2022 11:13 AM    BUN 9 04/09/2022 11:13 AM    CREATININE 0.62 04/09/2022 11:13 AM      Lab Results   Component Value Date/Time    CHOLSTRLTOT 199 08/05/2019 12:14 PM     (H) 08/05/2019 12:14 PM    HDL 67 08/05/2019 12:14 PM    TRIGLYCERIDE 61 08/05/2019 12:14 PM       Lab Results   Component Value Date/Time    ALKPHOSPHAT 82 04/09/2022 11:13 AM    ASTSGOT 12 04/09/2022 11:13 AM    ALTSGPT 17 04/09/2022 11:13 AM    TBILIRUBIN 0.5 04/09/2022 11:13 AM        Imaging/Testing:    I interpreted and/or reviewed the patient's neuroimaging    CT-CTA HEAD WITH & W/O-POST PROCESS    (Results Pending)   CT-CTA NECK WITH  & W/O-POST PROCESSING    (Results Pending)   MR-BRAIN-W/O    (Results Pending)   EC-ECHOCARDIOGRAM COMPLETE W/O CONT    (Results Pending)         Assessment and Plan:    Chrissy Pritchett is a 61 y.o. female with relevant history of osteoarthritis, hypothyroidism, prior DVT, and stress incontinence who presented to the hospital on 4/8/2022 for increased postoperative right knee pain s/p right TKA (4/6/22) and altered mental status for which neurology was consulted to address possible stroke. Neurological exam is significant for paucity of speech and halting when expressing herself with loss of fluency conversationally, oriented to self only, but interestingly is able to name, repeat, and comprehend (NIHSS 3). Her symptoms are concerning for possible small acute/subacute stroke to the left hemisphere. STAT CTA head and neck w/wo contrast revealed no acute hemorrhage, no large territory infarct, no mass, no large vessel occlusion, and no high grade stenosis per my personal read.  MRI brain wo contrast is ordered and pending completion. Unfortunately, the patient is not a candidate for acute intervention with IV thrombolytics as patient is outside the therapeutic time window with LKW approximated at yesterday 16:00 and recent right TKA surgery. Etiology of the possible stroke at this time is unknown, pending further stroke diagnostics as outlined below.     Plan:    -q4h and PRN neuro assessment. VS per nursing/unit protocol.   -Permissive HTN not indicated given no LVO or stenosis. SBP in 120s with stable exam. Normotensive BP goal 110-130/60-80.   -Antihypertensives per primary team.   -Obtain MRI Brain wo contrast.   -Telemetry; currently SR. Screen for Afib/arrhythmia.   -Obtain TTE with bubble study.   -Continue ASA 81 mg PO daily for secondary stroke prevention  -Atorvastatin 40 mg PO q HS for LDL goal <70. Check lipid panel.   -BG goal . Check hemoglobin A1c, goal <7%   -PT/OT/SLP eval and treat.   -DVT  PPX: SCDs and lovenox SQ daily      The evaluation of the patient, and recommended management, was discussed with Dr. Willingham, Dr. Diamond, and bedside RN.    Sree Lombardo, MSN [facility]  Nurse Practitioner  [facility]  (t) [phone]

## 2022-04-09 NOTE — CARE PLAN
Problem: Pain - Standard  Goal: Alleviation of pain or a reduction in pain to the patient’s comfort goal  Outcome: Progressing     Problem: Knowledge Deficit - Standard  Goal: Patient and family/care givers will demonstrate understanding of plan of care, disease process/condition, diagnostic tests and medications  Outcome: Progressing       The patient is Watcher - Medium risk of patient condition declining or worsening    Shift Goals  Clinical Goals: Pain management, safety  Patient Goals: NAOMI    Progress made toward(s) clinical / shift goals:  Taught pt 0-10 pain scale and non-pharmacological method of pain management, encouraged to verbalize when in pain. Administered PRN pain meds as needed.  Bed locked and in lowest position. Safety and fall precautions in place. Hourly rounding. Call light and belongings within reach. IV fluids for hydration and encouraged patient to drink fluids.     Patient is not progressing towards the following goals:

## 2022-04-09 NOTE — THERAPY
Speech Language Pathology   Clinical Swallow Evaluation     Patient Name: Chrissy Pritchett  AGE:  61 y.o., SEX:  female  Medical Record #: [id number]  Today's Date: 4/9/2022    Patient is 61 y.o. female admitted 4/8/22 for increasing right knee pain and AMS. PMHx:: PTSD, Arthritis, Hypothyroidism, and Stress incontinence.    MRI of brain 4/9/22:  1.  Motion degraded exam without obvious acute intracranial abnormality.  2.  Mild white matter disease is nonspecific, but most commonly associated with microvascular ischemic change.    Level of Consciousness: Alert, Awake  Affect/Behavior: Cooperative  Follows Directives: Yes  Orientation: Self  Hearing: Functional hearing  Vision: Functional vision    Subjective: Patient received awake with two daughters at bedside who stepped out for session. [place] of fluent speech. Significant word finding as seen by pt abandoning thought after 1-3 words.     Prior Living Situation & Level of Function: Patient was eating a regular diet without any concerns for dysphagia.     Oral Mechanism Evaluation  Facial Symmetry: Equal  Facial Sensation: Equal  Labial Observations: WFL  Lingual Observations: Midline  Dentition: Good  Comments:    Voice  Quality: WFL  Resonance: WFL  Intensity: Appropriate  Cough: WFL    Current Method of Nutrition   NPO until cleared by speech pathology    Assessment  Positioning: Fischer's (60-90 degrees)  Bolus Administration: Patient  Oxygen Requirements: Room Air  Factor(s) Affecting Performance: None    Swallowing Trials  Ice: WFL  Thin Liquid (TN0): WFL  Pureed (PU4): WFL  Regular (RG7): WFL  Comments: Bolus acceptance and containment adequate. Onset of swallow trigger was timely. No overt s/sx of aspiration appreciated across all trials tested including sips of thins from the straw. Pt non-verbally expressing strong distaste for PO trials including water. Trials of pudding and solids were limited due to same and required MAX encouragement to take 1-2 bites  of each. Mastication timely and functional for regular solids.      Clinical Impressions  No clinical signs of oropharyngeal dysphagia. A modified diet is not indicated at this time. SLP following 1-2x likely to ensure diet tolerance given limited participation.     Recommendations  1. Regular/Thin liquid diet   2.  Instrumentation: None indicated at this time  3.  Swallowing Instructions & Precautions:   Supervision: Independent  Positioning: Fully upright and midline during oral intake  Medication: Whole with liquid  Strategies: None  Oral Care: BID  4. SLP following to complete orders for a cognitive linguistic evaluation    Plan  Recommend Speech Therapy 2 times per week until therapy goals are met for the following treatments:  Dysphagia Training and Patient / Family / Caregiver Education.       04/09/22 1608   Vitals   O2 Delivery Device None - Room Air   Prior Level Of Function   Communication Within Functional Limits   Swallow Within Functional Limits   Dentition Intact   Dentures None   Hearing Within Functional Limits for Evaluation   Patient's Primary Language English   Short Term Goals   Short Term Goal # 1 Patient will consume a RG7/TN0 diet with no overt s/sx of aspiration.

## 2022-04-09 NOTE — ED NOTES
Med rec completed per patient and family at bedside  Allergies reviewed  No PO Antibiotics in the last 30 days

## 2022-04-09 NOTE — PROGRESS NOTES
No acute infarct, bleed noted on MRI brain.  Question if decadron and/or pain medications causing an encephalopathy.  Since no bleed I will discuss with ortho if okay to restart toradol. Stop decadron if okay with ortho as well.  Wean IV and oral narcotics as able.  Avoid benzodiazepines.  Monitor neurostatus.  I did discussed with neurology APRN and he will discuss with Dr Willingham.  I will update the daughter via phone.

## 2022-04-09 NOTE — ASSESSMENT & PLAN NOTE
Mobility as per PT/OT and ortho  DVT prophylaxis if CT head shows no acute hemorrhage.  Pain control as needed

## 2022-04-09 NOTE — PROGRESS NOTES
Assumed care of patient. Report received from GREGORIA Davey. Patient A&O to self only. Call light within reach, bed locked and in lowest position. Bed alarm on, instructed patient to call for assistance. Patient rating pain 0/10. Questions answered, no needs at this time.

## 2022-04-09 NOTE — PROGRESS NOTES
4 Eyes Skin Assessment Completed by GREGORIA Guzman and GREGORIA Davey.    Head WDL  Ears WDL  Nose Redness, Scab  Mouth WDL  Neck WDL  Breast/Chest WDL  Shoulder Blades WDL  Spine WDL  (R) Arm/Elbow/Hand WDL  (L) Arm/Elbow/Hand WDL  Abdomen WDL  Groin WDL  Scrotum/Coccyx/Buttocks WDL  (R) Leg Incision, Swelling  (L) Leg WDL  (R) Heel/Foot/Toe Swelling  (L) Heel/Foot/Toe WDL          Devices In Places Blood Pressure Cuff, Pulse Ox, and SCD's      Interventions In Place Pressure Redistribution Mattress    Possible Skin Injury No    Pictures Uploaded Into Epic N/A  Wound Consult Placed N/A  RN Wound Prevention Protocol Ordered No

## 2022-04-09 NOTE — THERAPY
Occupational Therapy   Initial Evaluation     Patient Name: Chrissy Pritchett  Age:  61 y.o., Sex:  female  Medical Record #: [id number]  Today's Date: 4/9/2022          Assessment  Patient is 61 y.o. female presents with SBA/supervision for ADL and functional mobility. Patient educated on safety with functional mobility. Patient doesn't require further OT at this time.     Plan    Recommend Occupational Therapy for Evaluation only      Discharge Recommendations: (P)  (Recommend: Home with 24 hour supervision from family, HHOT)     Subjective    Patient alert and cooperative during session. Nurse fam occupational therapist to work with patient.      Objective       04/09/22 0901   Total Time Spent   Total Time Spent (Mins) 25   Charge Group   OT Evaluation OT Evaluation Mod   Initial Contact Note    Initial Contact Note Order Received and Verified, Occupational Therapy Evaluation in Progress with Full Report to Follow.   Prior Living Situation   Prior Services Home-Independent   Housing / Facility 2 Story House   Steps In Home 15   Bathroom Set up Walk In Shower;Shower Curtain   Equipment Owned Front-Wheel Walker;Tub / Shower Seat   Lives with - Patient's Self Care Capacity Parents   Comments pt's daughter reports pt recently moved in with father and mother in law, pt was to be caregiver for them both   Prior Level of ADL Function   Self Feeding Independent   Grooming / Hygiene Independent   Bathing Independent   Dressing Independent   Toileting Independent   History of Falls   History of Falls No   Pain 0 - 10 Group   Therapist Pain Assessment   (0/10 pain during session)   Cognition    Comments Patient still requires extra time to answer questions, able to follow simple one step commands.   Passive ROM Upper Body   Passive ROM Upper Body WDL   Active ROM Upper Body   Active ROM Upper Body  WDL   Strength Upper Body   Comments >/=3+/5 during functional mobility   Sensation Upper Body   Upper Extremity Sensation  WDL    Bed Mobility    Supine to Sit Standby Assist   Scooting Standby Assist   ADL Assessment   Grooming Supervision  (Setup sink side washing face and hands with supervision for standing balance)   Upper Body Dressing Supervision   Lower Body Dressing Supervision  (Donning socks)   Toileting Supervision  (pericare)   How much help from another person does the patient currently need...   Putting on and taking off regular lower body clothing? 3   Bathing (including washing, rinsing, and drying)? 3   Toileting, which includes using a toilet, bedpan, or urinal? 3   Putting on and taking off regular upper body clothing? 4   Taking care of personal grooming such as brushing teeth? 4   Eating meals? 4   6 Clicks Daily Activity Score 21   Functional Mobility   Sit to Stand Standby Assist   Comments Patient ambulated in room/restroom/hallway with FWW SBA   Activity Tolerance   Comments Good activity tolerance   Education Group   Additional Comments Educated on safety with functional mobility   Anticipated Discharge Equipment and Recommendations   Discharge Recommendations   (Recommend: Home with 24 hour supervision from family, HHOT)   Interdisciplinary Plan of Care Collaboration   Collaboration Comments SBAR functional mobility and ADL status   Session Information   Date / Session Number  4/9: Eval only

## 2022-04-10 LAB
BASOPHILS # BLD AUTO: 0.6 % (ref 0–1.8)
BASOPHILS # BLD: 0.04 K/UL (ref 0–0.12)
EOSINOPHIL # BLD AUTO: 0.15 K/UL (ref 0–0.51)
EOSINOPHIL NFR BLD: 2.4 % (ref 0–6.9)
ERYTHROCYTE [DISTWIDTH] IN BLOOD BY AUTOMATED COUNT: 43.9 FL (ref 35.9–50)
HCT VFR BLD AUTO: 35.9 % (ref 37–47)
HGB BLD-MCNC: 11.8 G/DL (ref 12–16)
IMM GRANULOCYTES # BLD AUTO: 0.03 K/UL (ref 0–0.11)
IMM GRANULOCYTES NFR BLD AUTO: 0.5 % (ref 0–0.9)
LYMPHOCYTES # BLD AUTO: 1.33 K/UL (ref 1–4.8)
LYMPHOCYTES NFR BLD: 21.2 % (ref 22–41)
MCH RBC QN AUTO: 29.7 PG (ref 27–33)
MCHC RBC AUTO-ENTMCNC: 32.9 G/DL (ref 33.6–35)
MCV RBC AUTO: 90.4 FL (ref 81.4–97.8)
MONOCYTES # BLD AUTO: 0.51 K/UL (ref 0–0.85)
MONOCYTES NFR BLD AUTO: 8.1 % (ref 0–13.4)
NEUTROPHILS # BLD AUTO: 4.21 K/UL (ref 2–7.15)
NEUTROPHILS NFR BLD: 67.2 % (ref 44–72)
NRBC # BLD AUTO: 0 K/UL
NRBC BLD-RTO: 0 /100 WBC
PLATELET # BLD AUTO: 233 K/UL (ref 164–446)
PMV BLD AUTO: 9.9 FL (ref 9–12.9)
RBC # BLD AUTO: 3.97 M/UL (ref 4.2–5.4)
WBC # BLD AUTO: 6.3 K/UL (ref 4.8–10.8)

## 2022-04-10 PROCEDURE — 700102 HCHG RX REV CODE 250 W/ 637 OVERRIDE(OP): Performed by: HOSPITALIST

## 2022-04-10 PROCEDURE — 700102 HCHG RX REV CODE 250 W/ 637 OVERRIDE(OP): Performed by: NURSE PRACTITIONER

## 2022-04-10 PROCEDURE — 700111 HCHG RX REV CODE 636 W/ 250 OVERRIDE (IP): Performed by: ORTHOPAEDIC SURGERY

## 2022-04-10 PROCEDURE — A9270 NON-COVERED ITEM OR SERVICE: HCPCS | Performed by: HOSPITALIST

## 2022-04-10 PROCEDURE — 700102 HCHG RX REV CODE 250 W/ 637 OVERRIDE(OP): Performed by: ORTHOPAEDIC SURGERY

## 2022-04-10 PROCEDURE — 700105 HCHG RX REV CODE 258: Performed by: STUDENT IN AN ORGANIZED HEALTH CARE EDUCATION/TRAINING PROGRAM

## 2022-04-10 PROCEDURE — 99225 PR SUBSEQUENT OBSERVATION CARE,LEVEL II: CPT | Performed by: HOSPITALIST

## 2022-04-10 PROCEDURE — 36415 COLL VENOUS BLD VENIPUNCTURE: CPT

## 2022-04-10 PROCEDURE — 85025 COMPLETE CBC W/AUTO DIFF WBC: CPT

## 2022-04-10 PROCEDURE — G0378 HOSPITAL OBSERVATION PER HR: HCPCS

## 2022-04-10 PROCEDURE — A9270 NON-COVERED ITEM OR SERVICE: HCPCS | Performed by: NURSE PRACTITIONER

## 2022-04-10 PROCEDURE — 96372 THER/PROPH/DIAG INJ SC/IM: CPT

## 2022-04-10 PROCEDURE — A9270 NON-COVERED ITEM OR SERVICE: HCPCS | Performed by: ORTHOPAEDIC SURGERY

## 2022-04-10 RX ADMIN — ACETAMINOPHEN 1000 MG: 500 TABLET ORAL at 17:49

## 2022-04-10 RX ADMIN — ACETAMINOPHEN 1000 MG: 500 TABLET ORAL at 04:54

## 2022-04-10 RX ADMIN — ASPIRIN 81 MG 81 MG: 81 TABLET ORAL at 04:55

## 2022-04-10 RX ADMIN — SODIUM CHLORIDE: 9 INJECTION, SOLUTION INTRAVENOUS at 12:28

## 2022-04-10 RX ADMIN — ACETAMINOPHEN 1000 MG: 500 TABLET ORAL at 12:19

## 2022-04-10 RX ADMIN — DOCUSATE SODIUM 100 MG: 100 CAPSULE, LIQUID FILLED ORAL at 17:49

## 2022-04-10 RX ADMIN — ENOXAPARIN SODIUM 40 MG: 40 INJECTION SUBCUTANEOUS at 10:09

## 2022-04-10 RX ADMIN — DOCUSATE SODIUM 100 MG: 100 CAPSULE, LIQUID FILLED ORAL at 04:55

## 2022-04-10 RX ADMIN — SODIUM CHLORIDE: 9 INJECTION, SOLUTION INTRAVENOUS at 02:22

## 2022-04-10 ASSESSMENT — PAIN DESCRIPTION - PAIN TYPE
TYPE: ACUTE PAIN;SURGICAL PAIN
TYPE: ACUTE PAIN

## 2022-04-10 NOTE — PROGRESS NOTES
"   Orthopaedic Progress Note    Interval changes:  Patient doing well  No concerns with right knee   hospitalist following for encephalopathy work up    ROS - Patient denies any new issues.  Pain well controlled.    /79   Pulse 65   Temp 36.2 °C (97.2 °F) (Temporal)   Resp 18   Ht 1.651 m (5' 5\")   Wt 78.1 kg (172 lb 2.9 oz)   SpO2 97%       Patient seen and examined  No acute distress  Breathing non labored  RRR  RLE leida dressing CDI, DNVI, moves all toes, cap refill <2 sec.    Recent Labs     04/08/22  1945 04/09/22  1113 04/10/22  0228   WBC 6.4 6.0 6.3   RBC 3.53* 4.07* 3.97*   HEMOGLOBIN 10.5* 12.1 11.8*   HEMATOCRIT 31.4* 35.9* 35.9*   MCV 89.0 88.2 90.4   MCH 29.7 29.7 29.7   MCHC 33.4* 33.7 32.9*   RDW 43.1 43.6 43.9   PLATELETCT 161* 192 233   MPV 9.9 9.6 9.9       Active Hospital Problems    Diagnosis    • Altered mental status [R41.82]    • S/P TKR (total knee replacement), right [Z96.651]    • Hypokalemia [E87.6]    • Postoperative pain [G89.18]    • Hypothyroidism [E03.9]        Assessment/Plan:  Patient doing well  No concerns with right knee   hospitalist following for encephalopathy work up  POD#4 s/p Right total knee replacement  Wt bearing status - weight bearing as tolerated  Wound care/Drains - Dressings to be left in place  Future Procedures - none planned  Lovenox: Start 4/9, Duration-until ambulatory > 150'  Sutures/Staples out- 14 days post operatively  PT/OT-initiated  Antibiotics: Perioperative completed  DVT Prophylaxis- TEDS/SCDs/Foot pumps  Craft-none  Case Coordination for Discharge Planning - Disposition home   "

## 2022-04-10 NOTE — PROGRESS NOTES
Hospital Medicine Daily Progress Note    Date of Service  4/10/2022    Chief Complaint  Chrissy Pritchett is a 61 y.o. female admitted 4/8/2022 with changes to mental status associated with worsening knee discomfort.  At home patient has been medicating with oxycodone, meloxicam, and Tylenol.  Family is concerned of altered mental status.      Hospital Course  No notes on file    Interval Problem Update  Over the past 24 hours patient's mental status is improved remarkably off of her opiate pain medications, continue to monitor.  She is aware of the month and year, able to tell me that it is Sunday, incorrectly gives me the date is the 24th.  Is able to give me her name, [facility] as [facility].  Knows that she is in [facility].  Unable to give me her specific street address, telling me that she recently moved here.  Daughter at bedside.    I have personally seen and examined the patient at bedside. I discussed the plan of care with patient, family and bedside RN.      Code Status  Full Code    Disposition  Likely to home in the morning if ongoing improvement.    Review of Systems  All systems reviewed and negative except as noted per above.    Physical Exam  Temp:  [36.1 °C (97 °F)-36.4 °C (97.6 °F)] 36.2 °C (97.2 °F)  Pulse:  [65-70] 65  Resp:  [16-18] 18  BP: (112-143)/(73-80) 134/79  SpO2:  [95 %-97 %] 97 %    General appearance: NAD, conversant  Eyes: anicteric sclerae, moist conjunctivae; no lid-lag; PERRLA  HENT: Atraumatic; oropharynx clear with moist mucous membranes and no mucosal ulcerations; normal hard and soft palate  Neck: Trachea midline; FROM, supple, no thyromegaly or lymphadenopathy  Lungs: CTA, with normal respiratory effort and no intercostal retractions  CV: RRR, no MRGs  Abdomen: Soft, non-tender; no masses or HSM  Extremities: No peripheral edema or extremity lymphadenopathy, wound VAC, Prevnia, clean dry and intact to the right anterior knee.  Skin: Normal temperature, turgor  and texture; no rash, ulcers or subcutaneous nodules  Psych: Appropriate affect, alert and oriented to person, place and time      Current Facility-Administered Medications:   •  NS infusion, , Intravenous, Continuous, Parmjit Jones P.A.-C., Last Rate: 100 mL/hr at 04/10/22 1228, New Bag at 04/10/22 1228  •  atorvastatin (LIPITOR) tablet 80 mg, 80 mg, Oral, Q EVENING, JAYME UrbinaO., 80 mg at 04/09/22 1733  •  aspirin (ASA) chewable tab 81 mg, 81 mg, Oral, DAILY, HAYLEY Chin.RFREDDY, 81 mg at 04/10/22 0455  •  oxyCODONE immediate-release (ROXICODONE) tablet 5 mg, 5 mg, Oral, Q3HRS PRN **OR** oxyCODONE immediate release (ROXICODONE) tablet 10 mg, 10 mg, Oral, Q3HRS PRN **OR** HYDROmorphone (Dilaudid) injection 0.5 mg, 0.5 mg, Intravenous, Q4HRS PRN, JAYME UrbinaO.  •  Pharmacy Consult Request ...Pain Management Review 1 Each, 1 Each, Other, PHARMACY TO DOSE, Clovis Larson M.D.  •  ondansetron (ZOFRAN) syringe/vial injection 4 mg, 4 mg, Intravenous, Q4HRS PRN, Clovis Larson M.D., 4 mg at 04/09/22 1737  •  haloperidol lactate (HALDOL) injection 1 mg, 1 mg, Intravenous, Q6HRS PRN, Clovis Larson M.D.  •  docusate sodium (COLACE) capsule 100 mg, 100 mg, Oral, BID, Clovis Larson M.D., 100 mg at 04/10/22 0455  •  senna-docusate (PERICOLACE or SENOKOT S) 8.6-50 MG per tablet 1 Tablet, 1 Tablet, Oral, Nightly, Clovis Larson M.D.  •  senna-docusate (PERICOLACE or SENOKOT S) 8.6-50 MG per tablet 1 Tablet, 1 Tablet, Oral, Q24HRS PRN, Clovis Larson M.D.  •  polyethylene glycol/lytes (MIRALAX) PACKET 1 Packet, 1 Packet, Oral, BID PRN, Clovis Larson M.D.  •  magnesium hydroxide (MILK OF MAGNESIA) suspension 30 mL, 30 mL, Oral, QDAY PRN, Clovis Larson M.D.  •  bisacodyl (DULCOLAX) suppository 10 mg, 10 mg, Rectal, Q24HRS PRN, Clovis Larson M.D.  •  sodium phosphate (Fleet) enema 133 mL, 1 Each, Rectal, Once PRN, Clovis Larson M.D.  •  enoxaparin (LOVENOX) inj 40  mg, 40 mg, Subcutaneous, QDAY, Clovis Larson M.D., 40 mg at 04/10/22 1009  •  acetaminophen (TYLENOL) tablet 1,000 mg, 1,000 mg, Oral, Q6HRS, 1,000 mg at 04/10/22 1219 **FOLLOWED BY** [START ON 4/14/2022] acetaminophen (TYLENOL) tablet 1,000 mg, 1,000 mg, Oral, Q6HRS PRN, Clovis Larson M.D.      Fluids    Intake/Output Summary (Last 24 hours) at 4/10/2022 1517  Last data filed at 4/10/2022 0800  Gross per 24 hour   Intake 120 ml   Output --   Net 120 ml       Laboratory  Recent Labs     04/08/22 1945 04/09/22  1113 04/10/22  0228   WBC 6.4 6.0 6.3   RBC 3.53* 4.07* 3.97*   HEMOGLOBIN 10.5* 12.1 11.8*   HEMATOCRIT 31.4* 35.9* 35.9*   MCV 89.0 88.2 90.4   MCH 29.7 29.7 29.7   MCHC 33.4* 33.7 32.9*   RDW 43.1 43.6 43.9   PLATELETCT 161* 192 233   MPV 9.9 9.6 9.9     Recent Labs     04/08/22 1945 04/09/22  1113   SODIUM 141 140   POTASSIUM 3.3* 3.5*   CHLORIDE 109 106   CO2 19* 21   GLUCOSE 99 119*   BUN 8 9   CREATININE 0.79 0.62   CALCIUM 8.2* 9.0                   Imaging  MR-BRAIN-W/O   Final Result      1.  Motion degraded exam without obvious acute intracranial abnormality.   2.  Mild white matter disease is nonspecific, but most commonly associated with microvascular ischemic change.      CT-CTA HEAD WITH & W/O-POST PROCESS   Final Result      1.  CT angiogram of the [place] within normal limits.      2.  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           Assessment/Plan  * Postoperative pain- (present on admission)  Assessment & Plan  She has not received any pain medications 4/9am.  No current grimace.     Hypokalemia  Assessment & Plan  4/9 K:3.5  Replace and monitor    S/P TKR (total knee replacement), right  Assessment & Plan  Mobility as per PT/OT and ortho  DVT prophylaxis if CT head shows no acute hemorrhage.  Pain control as needed    Altered  mental status  Assessment & Plan  Markedly improved as of hospital day 1.  Ongoing anticipation of improvement.    Hypothyroidism- (present on admission)  Assessment & Plan  Check TSH  Last TSH:10.3 three years ago  On synthroid 200mcg daily. (hold until swallow eval)       VTE prophylaxis: enoxaparin ppx    I have performed a physical exam and reviewed and updated ROS and Plan today (4/10/2022). In review of yesterday's note (4/9/2022), there are no changes except as documented above.

## 2022-04-10 NOTE — CARE PLAN
The patient is Stable - Low risk of patient condition declining or worsening    Shift Goals  Clinical Goals: PO Intake  Patient Goals: Rest  Family Goals: Updates    Progress made toward(s) clinical / shift goals:    Problem: Pain - Standard  Goal: Alleviation of pain or a reduction in pain to the patient’s comfort goal  Outcome: Progressing     Problem: Fall Risk  Goal: Patient will remain free from falls  Outcome: Progressing     Problem: Neuro Status  Goal: Neuro status will remain stable or improve  Outcome: Progressing       Patient is not progressing towards the following goals:

## 2022-04-10 NOTE — PROGRESS NOTES
Monitor Summary: SB/SR 59-74, W[place] 0.16, QRS 0.08, QT 0.41, with rare PVCs per strip from monitor room.

## 2022-04-10 NOTE — PROGRESS NOTES
Assumed care of pt at 1900. Pt alert and oriented x2.  Disoriented to place and situation.  Able to work through questions with redirection.  Does not use call light appropriately.  Poor oral intake, even with encouragement.  Denies pain or discomfort.  Bed low & locked, alarm on.  Reviewed plan for evening.  Hourly rounding continues.

## 2022-04-11 ENCOUNTER — PHARMACY VISIT (OUTPATIENT)
Dept: PHARMACY | Facility: MEDICAL CENTER | Age: 62
End: 2022-04-11
Payer: COMMERCIAL

## 2022-04-11 VITALS
HEART RATE: 67 BPM | RESPIRATION RATE: 17 BRPM | HEIGHT: 65 IN | DIASTOLIC BLOOD PRESSURE: 85 MMHG | SYSTOLIC BLOOD PRESSURE: 141 MMHG | OXYGEN SATURATION: 98 % | WEIGHT: 172.18 LBS | BODY MASS INDEX: 28.69 KG/M2 | TEMPERATURE: 97.5 F

## 2022-04-11 PROBLEM — E87.6 HYPOKALEMIA: Status: RESOLVED | Noted: 2022-04-09 | Resolved: 2022-04-11

## 2022-04-11 PROBLEM — R41.82 ALTERED MENTAL STATUS: Status: RESOLVED | Noted: 2022-04-09 | Resolved: 2022-04-11

## 2022-04-11 PROBLEM — G89.18 POSTOPERATIVE PAIN: Status: RESOLVED | Noted: 2022-04-08 | Resolved: 2022-04-11

## 2022-04-11 PROCEDURE — 700105 HCHG RX REV CODE 258: Performed by: STUDENT IN AN ORGANIZED HEALTH CARE EDUCATION/TRAINING PROGRAM

## 2022-04-11 PROCEDURE — A9270 NON-COVERED ITEM OR SERVICE: HCPCS | Performed by: NURSE PRACTITIONER

## 2022-04-11 PROCEDURE — 700102 HCHG RX REV CODE 250 W/ 637 OVERRIDE(OP): Performed by: NURSE PRACTITIONER

## 2022-04-11 PROCEDURE — 99217 PR OBSERVATION CARE DISCHARGE: CPT | Performed by: HOSPITALIST

## 2022-04-11 PROCEDURE — RXMED WILLOW AMBULATORY MEDICATION CHARGE: Performed by: HOSPITALIST

## 2022-04-11 PROCEDURE — G0378 HOSPITAL OBSERVATION PER HR: HCPCS

## 2022-04-11 RX ORDER — ATORVASTATIN CALCIUM 80 MG/1
80 TABLET, FILM COATED ORAL EVERY EVENING
Qty: 30 TABLET | Refills: 0 | Status: SHIPPED | OUTPATIENT
Start: 2022-04-11

## 2022-04-11 RX ADMIN — SODIUM CHLORIDE: 9 INJECTION, SOLUTION INTRAVENOUS at 00:07

## 2022-04-11 RX ADMIN — ASPIRIN 81 MG 81 MG: 81 TABLET ORAL at 05:26

## 2022-04-11 NOTE — CARE PLAN
The patient is Stable - Low risk of patient condition declining or worsening    Shift Goals  Clinical Goals: Increase PO intake  Patient Goals: Rest  Family Goals: shower    Progress made toward(s) clinical / shift goals:  Pt encouraged to increase food and liquids PO. Shower provided and assisted by family.    Patient is not progressing towards the following goals:    N/A

## 2022-04-11 NOTE — CARE PLAN
The patient is Stable - Low risk of patient condition declining or worsening    Shift Goals  Clinical Goals: Increase PO intake  Patient Goals: Sleep  Family Goals: NAOMI    Progress made toward(s) clinical / shift goals:    Problem: Knowledge Deficit - Standard  Goal: Patient and family/care givers will demonstrate understanding of plan of care, disease process/condition, diagnostic tests and medications  Outcome: Progressing   The patient received education reinforcement regarding plan of care, condition, and medications.    Problem: Fall Risk  Goal: Patient will remain free from falls  Outcome: Progressing   Appropriate fall precautions are in place. Bed alarm is on at all times. Staff present for bathroom needs.   Problem: Neuro Status  Goal: Neuro status will remain stable or improve  Outcome: Progressing   Q4hr neuro checks. Neuro status remains stable.      Patient is not progressing towards the following goals:

## 2022-04-11 NOTE — DISCHARGE INSTRUCTIONS
Discharge Instructions    Discharged to home by car with relative. Discharged via wheelchair, hospital escort: Refused.  Special equipment needed: Not Applicable    Be sure to schedule a follow-up appointment with your primary care doctor or any specialists as instructed.     Discharge Plan:   Diet Plan: Discussed  Activity Level: Discussed  Confirmed Follow up Appointment: Patient to Call and Schedule Appointment  Confirmed Symptoms Management: Discussed  Medication Reconciliation Updated: Yes    I understand that a diet low in cholesterol, fat, and sodium is recommended for good health. Unless I have been given specific instructions below for another diet, I accept this instruction as my diet prescription.   Other diet: Regular    Special Instructions: None    · Is patient discharged on Warfarin / Coumadin?   No     Depression / Suicide Risk    As you are discharged from this [facility] facility, it is important to learn how to keep safe from harming yourself.    Recognize the warning signs:  · Abrupt changes in personality, positive or negative- including increase in energy   · Giving away possessions  · Change in eating patterns- significant weight changes-  positive or negative  · Change in sleeping patterns- unable to sleep or sleeping all the time   · Unwillingness or inability to communicate  · Depression  · Unusual sadness, discouragement and loneliness  · Talk of wanting to die  · Neglect of personal appearance   · Rebelliousness- reckless behavior  · Withdrawal from people/activities they love  · Confusion- inability to concentrate     If you or a loved one observes any of these behaviors or has concerns about self-harm, here's what you can do:  · Talk about it- your feelings and reasons for harming yourself  · Remove any means that you might use to hurt yourself (examples: pills, rope, extension cords, firearm)  · Get professional help from the community (Mental Health, Substance Abuse, psychological  counseling)  · Do not be alone:Call your Safe Contact- someone whom you trust who will be there for you.  · Call your local CRISIS HOTLINE 496-1459 or 335-453-1087  · Call your local Children's Mobile Crisis Response Team [place] (210) 455-3232 or www.LetMeHearYa  · Call the toll free National Suicide Prevention Hotlines   · National Suicide Prevention Lifeline 754-631-SDFQ (2328)  · National Hope Line Network 800-SUICIDE (840-8415)

## 2022-04-11 NOTE — DISCHARGE PLANNING
[facility] Transitional Care Coordination    Referral from:  Dr. Diamond    Insurance Provider on Facesheet: [facility]    Potential Rehab Diagnosis: TBD    Chart review indicates patient may have on going medical management and may have therapy needs to possibly meet inpatient rehab facility criteria with the goal of returning to community.    D/C support: TBD     Physiatry consultation forwarded per protocol.      Thank you for the referral.

## 2022-04-11 NOTE — CONSULTS
Brief Consult Note  Patient is a 61 y.o. with recent TKA who presented with confusion found to be dehydrated and on opiate medications. Has now returned to baseline. PT was CGA for mobility otherwise no other therapy needs. Recommend most likely home with HH vs SNF for further rehabilitation.

## 2022-04-12 NOTE — DISCHARGE SUMMARY
"Discharge Summary    CHIEF COMPLAINT ON ADMISSION  Chief Complaint   Patient presents with   • Leg Pain     R knee surg 4/6. Increased pain.       Reason for Admission  ems     Admission Date  4/8/2022    CODE STATUS  Prior    HPI & HOSPITAL COURSE  For full details of admission please see the consultation of Dr. Diamond dated 4/8/2022, briefly, \"Chrissy Pritchett is a 61 y.o. female who presented 4/8/2022 with scheduled surgery for right knee pain due to traumatic osteoarthritic changes.  On 4/6 she had right knee replacement by Dr Sidhu.  She was discharged and had her first physical therapy appointment 4/7 outpatient and her daughter noted an abrupt change in her mom and thought it was due to her pain block wearing off.  She states that since that time she has not been the same.  She has not been eating/drinking and not answering questions she normally readily can.  Per nurse the patient seemed to pocket her medication from this morning.  \"    Patient's mental status improved rapidly over the subsequent 24 hours, pain medications were limited to nonopiate multimodal therapies.  On hospital day 2 she was back to baseline and stable for discharge.    Therefore, she is discharged in good and stable condition to home with close outpatient follow-up.    The patient met 2-midnight criteria for an inpatient stay at the time of discharge.    Discharge Date  4/11/2022    FOLLOW UP ITEMS POST DISCHARGE  With ortho as scheduled.     DISCHARGE DIAGNOSES  Principal Problem (Resolved):    Postoperative pain POA: Yes  Active Problems:    Hypothyroidism POA: Yes    S/P TKR (total knee replacement), right POA: Unknown  Resolved Problems:    Altered mental status POA: Unknown    Hypokalemia POA: Unknown      FOLLOW UP  Future Appointments   Date Time Provider Department Center   4/15/2022 10:15 AM Luca Sidhu M.D. [facility] Cesar Bain M.D.  [address]  [address]  [facility] [id number]  [phone]    Schedule an " appointment as soon as possible for a visit in 1 week        MEDICATIONS ON DISCHARGE     Medication List      START taking these medications      Instructions   atorvastatin 80 MG tablet  Commonly known as: LIPITOR   Take 1 Tablet by mouth every evening.  Dose: 80 mg        CONTINUE taking these medications      Instructions   acetaminophen 650 MG CR tablet  Commonly known as: TYLENOL   Take 650 mg by mouth every 6 hours as needed. Indications: Pain  Dose: 650 mg     aspirin 81 MG EC tablet   Take 1 Tablet by mouth 2 times a day for 30 days.  Dose: 81 mg     buPROPion 200 MG SR tablet  Commonly known as: WELLBUTRIN SR   Take 200 mg by mouth 2 times a day.  Dose: 200 mg     docusate sodium 100 MG Caps  Commonly known as: Colace   Take 1 Capsule by mouth 2 times a day for 30 days.  Dose: 100 mg     levothyroxine 200 MCG Tabs  Commonly known as: SYNTHROID   LEVOTHYROXINE SODIUM 200 MCG TABS     meloxicam 7.5 MG Tabs  Commonly known as: MOBIC   Take 1 Tablet by mouth 2 times a day for 30 days.  Dose: 7.5 mg     MULTIVITAMIN PO   Take  by mouth every day.     ondansetron 4 MG Tabs tablet  Commonly known as: Zofran   Take 1 Tablet by mouth every four hours as needed for Nausea/Vomiting for up to 30 days.  Dose: 4 mg     oxyCODONE immediate-release 5 MG Tabs  Commonly known as: ROXICODONE   Take 5 mg by mouth every four hours as needed for Severe Pain.  Dose: 5 mg        STOP taking these medications    LORazepam 2 MG tablet  Commonly known as: ATIVAN        ASK your doctor about these medications      Instructions   traMADol 50 MG Tabs  Commonly known as: ULTRAM  Ask about: Should I take this medication?   Take 1 Tablet by mouth every 6 hours as needed for Severe Pain (ALTERNATING WITH OXYCODONE) for up to 10 days.  Dose: 50 mg            Allergies  Allergies   Allergen Reactions   • Sagebrush        DIET  No orders of the defined types were placed in this encounter.      RADIOLOGY    MR-BRAIN-W/O   Final Result      1.   Motion degraded exam without obvious acute intracranial abnormality.   2.  Mild white matter disease is nonspecific, but most commonly associated with microvascular ischemic change.      CT-CTA HEAD WITH & W/O-POST PROCESS   Final Result      1.  CT angiogram of the [place] within normal limits.      2.  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            ACTIVITY  As tolerated.  Weight bearing as tolerated    CONSULTATIONS  Dr. Diamond, hospital medicine  Dr. Willingham, neurology    PROCEDURES  None    LABORATORY  Lab Results   Component Value Date    SODIUM 140 04/09/2022    POTASSIUM 3.5 (L) 04/09/2022    CHLORIDE 106 04/09/2022    CO2 21 04/09/2022    GLUCOSE 119 (H) 04/09/2022    BUN 9 04/09/2022    CREATININE 0.62 04/09/2022        Lab Results   Component Value Date    WBC 6.3 04/10/2022    HEMOGLOBIN 11.8 (L) 04/10/2022    HEMATOCRIT 35.9 (L) 04/10/2022    PLATELETCT 233 04/10/2022        Total time of the discharge process exceeds 33 minutes.

## 2022-08-02 NOTE — ED PROVIDER NOTES
ED Provider Note    Scribed for Dr. Tanner Rogers M.D. by Thong Olivas. 4/8/2022  5:56 PM    Primary care provider: Tanner Bain M.D.  Means of arrival: EMS  History obtained from: Patient's Daughter  History limited by: None    CHIEF COMPLAINT  Chief Complaint   Patient presents with   • Leg Pain     R knee surg 4/6. Increased pain.       HPI  Chrissy Pritchett is a 61 y.o. female who presents to the Emergency Department via EMS for evaluation of increasing right knee pain onset 1 day ago. The patient recently underwent total knee replacement by  2 days ago, but yesterday at 6 AM the patient developed increasing knee pain after waking up. Additionally, the patient went to physical therapy yesterday which has markedly exacerbated her knee pain. The patient's daughter notes that the patient has been increasingly unresponsive due to the pain. Associated symptoms include decreased PO intake and slight confusion. The patient denies any loss of sensation to her right lower extremity. EMS reports medication the patient with 100 mcg of Fentanyl with moderate alleviation. Additionally, the patient has been medicating with Oxycodone 5 mg (every 4 hours), Meloxicam 7.5 mg (twice a day), and Tylenol 650 mg (intermittently) with minimal alleviation. The patient has been compliant with daily baby aspirin for the past 2 days to prevent DVT's. She has a history of arthritis and thyroid issues.      REVIEW OF SYSTEMS  Pertinent positives include right knee pain and decreased PO intake and slight confusion. Pertinent negatives include no loss of sensation to her right lower extremity. As above, all other systems reviewed and are negative.   See HPI for further details.     PAST MEDICAL HISTORY   has a past medical history of Arthritis, Hypothyroidism, and Stress incontinence.    SURGICAL HISTORY   has a past surgical history that includes hysterectomy, vaginal (1998); pelviscopy; arthroscopy, knee; shoulder  "arthroscopy; bladder suspension (2011); and cystoscopy (2011).    SOCIAL HISTORY  Social History     Tobacco Use   • Smoking status: Former Smoker     Packs/day: 0.30     Years: 4.00     Pack years: 1.20     Quit date: 2003     Years since quittin.2   • Smokeless tobacco: Never Used   Vaping Use   • Vaping Use: Never used   Substance Use Topics   • Alcohol use: No   • Drug use: No      Social History     Substance and Sexual Activity   Drug Use No       FAMILY HISTORY  Family History   Problem Relation Age of Onset   • Hypertension Father    • Arrythmia Father    • Heart Disease Maternal Grandmother 55       CURRENT MEDICATIONS  Home Medications    **Home medications have not yet been reviewed for this encounter**         ALLERGIES  Allergies   Allergen Reactions   • Sagebrush        PHYSICAL EXAM  VITAL SIGNS: /74   Pulse 80   Temp 37.5 °C (99.5 °F) (Temporal)   Resp 14   Ht 1.651 m (5' 5\")   Wt 78.9 kg (174 lb)   SpO2 99%   BMI 28.96 kg/m²     Constitutional: Well developed, Well nourished, mild distress, Non-toxic appearance.   HENT: Normocephalic, Atraumatic, Bilateral external ears normal, Oropharynx moist, No oral exudates.   Eyes: PERRLA, EOMI, Conjunctiva normal, No discharge.   Neck: No tenderness, Supple, No stridor.   Lymphatic: No lymphadenopathy noted.   Cardiovascular: Normal heart rate, Normal rhythm.   Thorax & Lungs: Clear to auscultation bilaterally, No respiratory distress, No wheezing, No crackles.   Abdomen: Soft, No tenderness, No masses, No pulsatile masses.   Skin: Warm, Dry, No erythema, No rash.   Extremities:, No edema No cyanosis.   Musculoskeletal: No tenderness to palpation or major deformities noted.  Intact distal pulses  Neurologic: Awake, alert. Moves all extremities spontaneously.  Psychiatric: Affect normal, Judgment normal, Mood normal.     LABS  Results for orders placed or performed during the hospital encounter of 22   CBC WITH " DIFFERENTIAL   Result Value Ref Range    WBC 6.4 4.8 - 10.8 K/uL    RBC 3.53 (L) 4.20 - 5.40 M/uL    Hemoglobin 10.5 (L) 12.0 - 16.0 g/dL    Hematocrit 31.4 (L) 37.0 - 47.0 %    MCV 89.0 81.4 - 97.8 fL    MCH 29.7 27.0 - 33.0 pg    MCHC 33.4 (L) 33.6 - 35.0 g/dL    RDW 43.1 35.9 - 50.0 fL    Platelet Count 161 (L) 164 - 446 K/uL    MPV 9.9 9.0 - 12.9 fL    Neutrophils-Polys 70.20 44.00 - 72.00 %    Lymphocytes 18.10 (L) 22.00 - 41.00 %    Monocytes 10.50 0.00 - 13.40 %    Eosinophils 0.30 0.00 - 6.90 %    Basophils 0.30 0.00 - 1.80 %    Immature Granulocytes 0.60 0.00 - 0.90 %    Nucleated RBC 0.00 /100 WBC    Neutrophils (Absolute) 4.49 2.00 - 7.15 K/uL    Lymphs (Absolute) 1.16 1.00 - 4.80 K/uL    Monos (Absolute) 0.67 0.00 - 0.85 K/uL    Eos (Absolute) 0.02 0.00 - 0.51 K/uL    Baso (Absolute) 0.02 0.00 - 0.12 K/uL    Immature Granulocytes (abs) 0.04 0.00 - 0.11 K/uL    NRBC (Absolute) 0.00 K/uL   COMP METABOLIC PANEL   Result Value Ref Range    Sodium 141 135 - 145 mmol/L    Potassium 3.3 (L) 3.6 - 5.5 mmol/L    Chloride 109 96 - 112 mmol/L    Co2 19 (L) 20 - 33 mmol/L    Anion Gap 13.0 7.0 - 16.0    Glucose 99 65 - 99 mg/dL    Bun 8 8 - 22 mg/dL    Creatinine 0.79 0.50 - 1.40 mg/dL    Calcium 8.2 (L) 8.5 - 10.5 mg/dL    AST(SGOT) 18 12 - 45 U/L    ALT(SGPT) 17 2 - 50 U/L    Alkaline Phosphatase 68 30 - 99 U/L    Total Bilirubin 0.5 0.1 - 1.5 mg/dL    Albumin 3.5 3.2 - 4.9 g/dL    Total Protein 6.1 6.0 - 8.2 g/dL    Globulin 2.6 1.9 - 3.5 g/dL    A-G Ratio 1.3 g/dL   ESTIMATED GFR   Result Value Ref Range    GFR (CKD-EPI) 85 >60 mL/min/1.73 m 2       All labs reviewed by me.      COURSE & MEDICAL DECISION MAKING  Pertinent Labs & Imaging studies reviewed. (See chart for details)    5:56 PM - Patient seen and examined at bedside. Explained that I would be contacting Orthopedics in order to best develop a plan of care.    6:11 PM - Paged Orthopedics    6:28 PM -  I discussed the patient's case and the above  "findings with Dr. Larson (Orthopedics) who recommended modifying the patient's pain management and discharge.    6:34 PM - Patient was reevaluated at bedside. I explained my consult with Dr. Larson. Discussed that we would be changing the patient's current pain management a. The patient's daughters have expressed a desire to admit the patient due to her \"altered\" status. They state that the patient is unaware of the current year.     7:07 PM - Ordered Morphine 4 MG/ML and NS infusion 1000 mL to treat the patient    7:19 PM - Nursing staff informed me that the patient's daughter has been in contact with Dr. Larson and that he relayed a plan for admission.    7:30 PM - Dr. Larson has signed orders for admission.    HYDRATION: Based on the patient's presentation of Dehydration the patient was given IV fluids. IV Hydration was used because oral hydration was not adequate alone. Upon recheck following hydration, the patient was improved.    Decision Making:  Patient with knee replacement a couple of days ago having significant pain with this and may be some altered mental status as well.  Some concern is also for dehydration although clinically she does not appear to be will c give her some fluids here given Marsha the orthopedic surgeon has given some orders for admission.    DISPOSITION:  Patient will be hospitalized by Dr. Larson in guarded condition.    FINAL IMPRESSION  1. Postoperative pain    2. Confusion          Thong BRAY (Scribe), am scribing for, and in the presence of, Tanner Rogers M.D..    Electronically signed by: Thong Olivas (Scribe), 4/8/2022 Tanner CAPUTO M.D. personally performed the services described in this documentation, as scribed by Thong Olivas in my presence, and it is both accurate and complete.    The note accurately reflects work and decisions made by me.  Tanner Rogers M.D.  4/8/2022  9:07 PM    " declines

## 2024-01-12 ENCOUNTER — APPOINTMENT (OUTPATIENT)
Dept: RADIOLOGY | Facility: MEDICAL CENTER | Age: 64
End: 2024-01-12
Attending: FAMILY MEDICINE
Payer: COMMERCIAL

## 2024-01-30 ENCOUNTER — HOSPITAL ENCOUNTER (OUTPATIENT)
Dept: RADIOLOGY | Facility: MEDICAL CENTER | Age: 64
End: 2024-01-30
Attending: FAMILY MEDICINE
Payer: COMMERCIAL

## 2024-01-30 DIAGNOSIS — Z12.31 ENCOUNTER FOR SCREENING MAMMOGRAM FOR MALIGNANT NEOPLASM OF BREAST: ICD-10-CM

## 2024-01-30 PROCEDURE — 77067 SCR MAMMO BI INCL CAD: CPT

## 2024-11-04 ENCOUNTER — OFFICE VISIT (OUTPATIENT)
Dept: MEDICAL GROUP | Facility: PHYSICIAN GROUP | Age: 64
End: 2024-11-04
Payer: COMMERCIAL

## 2024-11-04 VITALS
SYSTOLIC BLOOD PRESSURE: 100 MMHG | HEART RATE: 68 BPM | WEIGHT: 190.8 LBS | TEMPERATURE: 96.9 F | HEIGHT: 66 IN | OXYGEN SATURATION: 96 % | BODY MASS INDEX: 30.67 KG/M2 | DIASTOLIC BLOOD PRESSURE: 60 MMHG

## 2024-11-04 DIAGNOSIS — E66.9 OBESITY (BMI 30-39.9): ICD-10-CM

## 2024-11-04 DIAGNOSIS — E03.9 HYPOTHYROIDISM, UNSPECIFIED TYPE: ICD-10-CM
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
## 2024-11-04 DIAGNOSIS — F41.9 ANXIETY: ICD-10-CM

## 2024-11-04 DIAGNOSIS — F90.9 ATTENTION DEFICIT HYPERACTIVITY DISORDER (ADHD), UNSPECIFIED ADHD TYPE: ICD-10-CM

## 2024-11-04 PROCEDURE — 3074F SYST BP LT 130 MM HG: CPT | Performed by: FAMILY MEDICINE

## 2024-11-04 PROCEDURE — 3078F DIAST BP <80 MM HG: CPT | Performed by: FAMILY MEDICINE

## 2024-11-04 PROCEDURE — 99204 OFFICE O/P NEW MOD 45 MIN: CPT | Performed by: FAMILY MEDICINE

## 2024-11-04 RX ORDER — BUPROPION HYDROCHLORIDE 150 MG/1
150 TABLET ORAL EVERY MORNING
COMMUNITY
Start: 2024-09-27

## 2024-11-04 RX ORDER — LEVOTHYROXINE, LIOTHYRONINE 76; 18 UG/1; UG/1
120 TABLET ORAL DAILY
COMMUNITY
Start: 2024-08-30

## 2024-11-04 RX ORDER — DEXTROAMPHETAMINE SACCHARATE, AMPHETAMINE ASPARTATE MONOHYDRATE, DEXTROAMPHETAMINE SULFATE AND AMPHETAMINE SULFATE 3.75; 3.75; 3.75; 3.75 MG/1; MG/1; MG/1; MG/1
15 CAPSULE, EXTENDED RELEASE ORAL EVERY MORNING
COMMUNITY
Start: 2024-09-26

## 2024-11-04 ASSESSMENT — PATIENT HEALTH QUESTIONNAIRE - PHQ9: CLINICAL INTERPRETATION OF PHQ2 SCORE: 0

## 2024-11-04 NOTE — PROGRESS NOTES
Verbal consent was acquired by the patient to use [employer] ambient listening note generation during this visit ***    Subjective:     HPI:   History of Present Illness  The patient is a 64-year-old female who presents today to [facility]. She does have a previous history of hypothyroidism as well as stress incontinence and arthritis of the right knee status post total knee replacement.    She is due for all annual screening labs and is up to date on mammogram screening. However, she is due for colorectal cancer screening and multiple vaccines, which will be discussed in the office today. She has also not had labs since 2022. Her current medications include Lipitor for hyperlipidemia, aspirin, Lyrica, Ativan, trazodone, Vraylar, pregabalin, levothyroxine, and Wellbutrin.    Health Maintenance: {COMPLETED:301731}    Objective:     Exam:  There were no vitals taken for this visit. There is no height or weight on file to calculate BMI.    Physical Exam  Vitals reviewed.   Constitutional:       Appearance: Normal appearance.   HENT:      Head: Normocephalic and atraumatic.      Right Ear: External ear normal.      Left Ear: External ear normal.      Nose: Nose normal.   Cardiovascular:      Rate and Rhythm: Normal rate and regular rhythm.      Pulses: Normal pulses.      Heart sounds: Normal heart sounds. No murmur heard.  Pulmonary:      Effort: Pulmonary effort is normal. No respiratory distress.      Breath sounds: Normal breath sounds. No wheezing.   Abdominal:      General: Abdomen is flat.      Palpations: Abdomen is soft.   Skin:     General: Skin is warm and dry.   Neurological:      Mental Status: She is alert and oriented to person, place, and time.   Psychiatric:         Mood and Affect: Mood normal.         Behavior: Behavior normal.         {A chaperone was offered to the patient during today's exam.:[id number]}    Results      Assessment & Plan:     No diagnosis found.    Assessment & Plan  1.  Establishment of care.          No follow-ups on file.    Please note that this dictation was created using voice recognition software. I have made every reasonable attempt to correct obvious errors, but I expect that there are errors of grammar and possibly content that I did not discover before finalizing the note.    Glo Florence MD  Family Medicine and Non - Operative Sports Medicine   [facility]

## 2024-11-04 NOTE — PROGRESS NOTES
Verbal consent was acquired by the patient to use [employer] ambient listening note generation during this visit.    Subjective:     HPI:   History of Present Illness  The patient is a 64-year-old female who presents today to \[facility]\"" care.    She has a history of hypothyroidism, stress incontinence, and arthritis of the right knee, which was treated with a total knee replacement on 04/06/2022 by Dr. Sidhu at [facility]. Despite the surgery, she still experiences knee pain. She had 4 knee surgeries, including an ACL repair, and was without an ACL for 20 years.    She is currently taking several medications, including aspirin,  Ativan, levothyroxine, and Wellbutrin and adderall. However, she is no longer taking Lyrica or Lipitor. She is also on [place] Thyroid 120 mg, prescribed by her gynecologist, due to a TSH level of 350. Her last TSH level was 49, and she is awaiting the results of her most recent test. Additionally, she takes Adderall 15 mg  for ADHD and Wellbutrin 150 mg once a day.    She has been under the care of Dr. Obdulia Disla, a psychiatrist, for about 5 years for medication management. She is currently taking lorazepam, prescribed by her psychiatrist. She recently had labs done by Lizbet Ospina, a [place] gynecologist [facility] [place], but has not received the results yet.    She is up to date on mammogram screening and colorectal ca screening. However, she is due for multiple vaccines, which will be discussed in the office today. She had a mammogram this year and a Cologuard test last year, which was normal.    She has been neglecting her health due to caring for her parents for 3 years. She has a lot of sunspots and sees a dermatologist every 6 months.     She had a hysterectomy for grade 3 fibroids and has completed one+ Pap smears following the procedure.     She is trying to lose 40 pounds and has started exercising again, including swimming and biking.    SOCIAL  "HISTORY  She is no longer smoking. She denies any concerns for alcohol consumption, over drinking, binge drinking. She denies any illicit drugs.    FAMILY HISTORY  Her mother had lung, pancreas, and liver cancer. She was a big smoker her whole life. Her maternal grandmother had heart disease and hypertension. Her father had arrhythmia and A. fib.    ALLERGIES  She has no known drug allergies.    IMMUNIZATIONS  She had her first shingles vaccine in 04/2020 and second one with Dr. Tanner Bain.    Health Maintenance: Completed    Objective:     Exam:  /60 (BP Location: Right arm, Patient Position: Sitting, BP Cuff Size: Adult)   Pulse 68   Temp 36.1 °C (96.9 °F)   Ht 1.676 m (5' 6\")   Wt 86.5 kg (190 lb 12.8 oz)   SpO2 96%   BMI 30.80 kg/m²  Body mass index is 30.8 kg/m².    Physical Exam  Vitals reviewed.   Constitutional:       Appearance: Normal appearance.   HENT:      Head: Normocephalic and atraumatic.      Right Ear: External ear normal.      Left Ear: External ear normal.      Nose: Nose normal.   Cardiovascular:      Rate and Rhythm: Normal rate and regular rhythm.      Pulses: Normal pulses.      Heart sounds: Normal heart sounds. No murmur heard.  Pulmonary:      Effort: Pulmonary effort is normal. No respiratory distress.      Breath sounds: Normal breath sounds. No wheezing.   Abdominal:      General: Abdomen is flat.      Palpations: Abdomen is soft.   Skin:     General: Skin is warm and dry.   Neurological:      Mental Status: She is alert and oriented to person, place, and time.   Psychiatric:         Mood and Affect: Mood normal.         Behavior: Behavior normal.             Results  Laboratory Studies per patient  TSH was 350, then down to 49.    Testing  Cologuard test was normal.    Assessment & Plan:     1. Obesity (BMI 30-39.9)  Patient identified as having weight management issue.  Appropriate orders and counseling given.      2. Hypothyroidism, unspecified type  Patient identified " as having weight management issue.  Appropriate orders and counseling given.      3. Severe sun exposure, initial encounter  Referral to Dermatology      4. Anxiety  Referral to Behavioral Health      5. Attention deficit hyperactivity disorder (ADHD), unspecified ADHD type  Referral to Behavioral Health          Assessment & Plan  1. Hypothyroidism.  She is currently on [place] Thyroid 120 mg. Recent labs were done last week, and results are pending. The thyroid function will be reassessed once the lab results are available.    2. Stress Incontinence.  No specific treatment plan discussed during this visit.    3. Right Knee Arthritis (status post total knee replacement).  She reports persistent pain in the right knee despite the replacement. Encouraged to continue low-impact exercises such as swimming and biking to manage symptoms and reduce weight, which may help alleviate knee pain.    4. Hyperlipidemia.  Currently on no medications at this time.  Continue to monitor.  Awaiting recent lab work.    5. Anxiety.  She is on lorazepam, Wellbutrin and Adderall, managed by her psychiatrist. The goal is to reduce and eventually eliminate lorazepam use.     6. Depression.  Currently on Wellbutrin 150 mg once a day. No changes in medication were discussed.    7. Health Maintenance.  -Up-to-date for colorectal cancer screening; last Cologuard test was normal.  Obtain records  - Up to date on mammogram screening.  - Recommended to check tetanus vaccination status and administer if not up to date.  Obtain records.  - Advised to get the annual flu shot and COVID vaccine at a pharmacy.  - Confirmed receipt of both doses of the shingles vaccine.  Obtain records.    Follow-up  Return in 3 months for follow-up.        Return in about 3 months (around 2/4/2025) for Med check.    Please note that this dictation was created using voice recognition software. I have made every reasonable attempt to correct obvious errors, but I expect  that there are errors of grammar and possibly content that I did not discover before finalizing the note.    Glo Florence MD  Family Medicine and Non - Operative Sports Medicine   [facility]

## 2024-12-30 ENCOUNTER — OFFICE VISIT (OUTPATIENT)
Dept: DERMATOLOGY | Facility: IMAGING CENTER | Age: 64
End: 2024-12-30
Payer: COMMERCIAL

## 2024-12-30 DIAGNOSIS — D22.9 MULTIPLE NEVI: ICD-10-CM

## 2024-12-30 DIAGNOSIS — Z12.83 SKIN CANCER SCREENING: ICD-10-CM

## 2024-12-30 DIAGNOSIS — L57.8 OTHER SKIN CHANGES DUE TO CHRONIC EXPOSURE TO NONIONIZING RADIATION: ICD-10-CM

## 2024-12-30 DIAGNOSIS — L82.1 SEBORRHEIC KERATOSIS: ICD-10-CM

## 2024-12-30 NOTE — PROGRESS NOTES
[facility] NOTE    Chief Complaint   Patient presents with    Establish Care     ALYSSA        HPI:    Chrissy Pritchett is a 64 y.o. female here for evaluation of above, ALYSSA.     Today notes following concern:   - Rt torso- scaly bump    No other symptomatic (itching, painful, burning) or changing lesions.       Dermatology History:      - Prior history of skin cancer: No     - Family history of skin cancer: No     - Hx of sunburns: Yes    Review of Systems: No fevers, chill. Pertinent positives and negatives above.       Medications, Medical History, Surgical History, Family History & Allergies:  Reviewed in the chart, relevant history noted above.         PHYSICAL EXAM  Full body skin check of the scalp, face, neck, trunk, extremities revealed  Skin type 2    - scattered melanotic macules and papules on the trunk and extremities  - scattered tan macules without surface scale on sun exposed areas of face, neck, upper chest, arms  - scattered tan waxy to gray stuck on papules and plaques on the trunk and extremities    ASSESSMENT & PLAN    # Melanotic nevi  - clinically benign appearing today  - ABCDEs of atypical appearing moles discussed.     # Lentigines  - discussed this is a result of sun exposure, photodamage  - recommend regular photoprotection    Benign lesions including hemangiomas, seborrheic keratosis were noted today, Reassurance provided, no treatment needed.       Skin Cancer Prevention Counseling   Advise regular sun protection/sunscreen use, SPF 30 or greater, recommend mineral sunscreen, and to reapply every  minutes.   Recommend broad brimmed hats, UPF sun protective clothing when outdoors for extended periods of time   Recommend self checks at home,  ABCDEs of melanoma discussed         Follow up: in 1 year for ALYSSA, sooner as needed       Emili Barnett MD  [facility]

## 2024-12-31 NOTE — PROCEDURES
Suture Removal    Date/Time: 12/31/2024 9:51 AM    Performed by: Emili Barnett M.D.  Authorized by: Emili Barnett M.D.

## 2025-01-13 ENCOUNTER — TELEPHONE (OUTPATIENT)
Dept: BEHAVIORAL HEALTH | Facility: CLINIC | Age: 65
End: 2025-01-13

## 2025-01-13 NOTE — TELEPHONE ENCOUNTER
DOCUMENTATION OF PAR STATUS:    1. Name of Medication & Dose: Vraylar 1.5mg    2. Name of Prescription Coverage Company & phone #: [facility] Dept / P [phone]  F [phone]    3. Date Prior Auth Submitted: 12/2024    5. Prior Auth Status? Approved    As of 01/13/2025 pts ins. [facility] states Vraylar 1.5mg PA is still under review b/c they requested the most recent office visit notes to establish if this medication will be covered under '' medical necessity'' . They need the notes to  see what med history she has tried/failed and has not.Since pt is coming from your previous practice I was unable to fax any records. Can you provide those records so I can fax them over or how would you like to go about this please advise.

## 2025-01-16 ENCOUNTER — DOCUMENTATION (OUTPATIENT)
Dept: BEHAVIORAL HEALTH | Facility: CLINIC | Age: 65
End: 2025-01-16

## 2025-01-30 ENCOUNTER — DOCUMENTATION (OUTPATIENT)
Dept: BEHAVIORAL HEALTH | Facility: CLINIC | Age: 65
End: 2025-01-30
Payer: COMMERCIAL

## 2025-02-03 ENCOUNTER — PATIENT MESSAGE (OUTPATIENT)
Dept: HEALTH INFORMATION MANAGEMENT | Facility: OTHER | Age: 65
End: 2025-02-03

## 2025-02-03 ENCOUNTER — TELEPHONE (OUTPATIENT)
Dept: BEHAVIORAL HEALTH | Facility: CLINIC | Age: 65
End: 2025-02-03

## 2025-02-03 NOTE — TELEPHONE ENCOUNTER
Phone Number Called: [phone]    Call outcome: Spoke to patient regarding message below.    Message: Called pt to inform her Vraylar 1.mg PA was approved and updated prescription was sent over to please follow up with her pharmacy for  time/date.Pt agreed

## 2025-02-11 ENCOUNTER — OFFICE VISIT (OUTPATIENT)
Dept: BEHAVIORAL HEALTH | Facility: CLINIC | Age: 65
End: 2025-02-11
Payer: COMMERCIAL

## 2025-02-11 VITALS — SYSTOLIC BLOOD PRESSURE: 118 MMHG | OXYGEN SATURATION: 100 % | DIASTOLIC BLOOD PRESSURE: 62 MMHG | HEART RATE: 69 BPM

## 2025-02-11 DIAGNOSIS — F90.0 ADHD (ATTENTION DEFICIT HYPERACTIVITY DISORDER), INATTENTIVE TYPE: ICD-10-CM

## 2025-02-11 DIAGNOSIS — F41.0 GENERALIZED ANXIETY DISORDER WITH PANIC ATTACKS: ICD-10-CM

## 2025-02-11 DIAGNOSIS — F33.1 MAJOR DEPRESSIVE DISORDER, RECURRENT EPISODE, MODERATE (HCC): ICD-10-CM

## 2025-02-11 DIAGNOSIS — F41.1 GENERALIZED ANXIETY DISORDER WITH PANIC ATTACKS: ICD-10-CM

## 2025-02-11 PROCEDURE — 99214 OFFICE O/P EST MOD 30 MIN: CPT | Performed by: PSYCHIATRY & NEUROLOGY

## 2025-02-11 PROCEDURE — 90833 PSYTX W PT W E/M 30 MIN: CPT | Performed by: PSYCHIATRY & NEUROLOGY

## 2025-02-11 RX ORDER — LISDEXAMFETAMINE DIMESYLATE 50 MG/1
50 CAPSULE ORAL EVERY MORNING
Qty: 30 CAPSULE | Refills: 0 | Status: SHIPPED | OUTPATIENT
Start: 2025-02-11 | End: 2025-02-14 | Stop reason: SDUPTHER

## 2025-02-11 RX ORDER — LISDEXAMFETAMINE DIMESYLATE 50 MG/1
50 CAPSULE ORAL EVERY MORNING
Qty: 30 CAPSULE | Refills: 0 | Status: SHIPPED | OUTPATIENT
Start: 2025-03-13 | End: 2025-03-17 | Stop reason: DRUGHIGH

## 2025-02-11 ASSESSMENT — ANXIETY QUESTIONNAIRES
4. TROUBLE RELAXING: SEVERAL DAYS
1. FEELING NERVOUS, ANXIOUS, OR ON EDGE: SEVERAL DAYS
3. WORRYING TOO MUCH ABOUT DIFFERENT THINGS: SEVERAL DAYS
6. BECOMING EASILY ANNOYED OR IRRITABLE: NOT AT ALL
2. NOT BEING ABLE TO STOP OR CONTROL WORRYING: SEVERAL DAYS
IF YOU CHECKED OFF ANY PROBLEMS ON THIS QUESTIONNAIRE, HOW DIFFICULT HAVE THESE PROBLEMS MADE IT FOR YOU TO DO YOUR WORK, TAKE CARE OF THINGS AT HOME, OR GET ALONG WITH OTHER PEOPLE: NOT DIFFICULT AT ALL
7. FEELING AFRAID AS IF SOMETHING AWFUL MIGHT HAPPEN: NOT AT ALL
GAD7 TOTAL SCORE: 4
5. BEING SO RESTLESS THAT IT IS HARD TO SIT STILL: NOT AT ALL

## 2025-02-11 ASSESSMENT — PATIENT HEALTH QUESTIONNAIRE - PHQ9
5. POOR APPETITE OR OVEREATING: 0 - NOT AT ALL
CLINICAL INTERPRETATION OF PHQ2 SCORE: 0
SUM OF ALL RESPONSES TO PHQ QUESTIONS 1-9: 0
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
## 2025-02-14 DIAGNOSIS — F90.0 ADHD (ATTENTION DEFICIT HYPERACTIVITY DISORDER), INATTENTIVE TYPE: ICD-10-CM

## 2025-02-14 RX ORDER — LISDEXAMFETAMINE DIMESYLATE 50 MG/1
50 CAPSULE ORAL EVERY MORNING
Qty: 30 CAPSULE | Refills: 0 | Status: SHIPPED | OUTPATIENT
Start: 2025-02-14 | End: 2025-03-16

## 2025-02-14 NOTE — TELEPHONE ENCOUNTER
is out today pt is requesting we resend RX because previous pharmacy did not have it instock can you resend for pt ? Thank you       Received request via: Patient    Was the patient seen in the last year in this department? Yes    Does the patient have an active prescription (recently filled or refills available) for medication(s) requested? No    Pharmacy Name: [facility] #[id number]    Does the patient have USP Plus and need 100-day supply? (This applies to ALL medications) Patient does not have SCP

## 2025-03-17 ENCOUNTER — OFFICE VISIT (OUTPATIENT)
Dept: BEHAVIORAL HEALTH | Facility: CLINIC | Age: 65
End: 2025-03-17
Payer: COMMERCIAL

## 2025-03-17 VITALS
WEIGHT: 173.8 LBS | SYSTOLIC BLOOD PRESSURE: 116 MMHG | HEART RATE: 75 BPM | OXYGEN SATURATION: 96 % | BODY MASS INDEX: 28.05 KG/M2 | DIASTOLIC BLOOD PRESSURE: 62 MMHG

## 2025-03-17 DIAGNOSIS — F90.0 ADHD (ATTENTION DEFICIT HYPERACTIVITY DISORDER), INATTENTIVE TYPE: ICD-10-CM

## 2025-03-17 DIAGNOSIS — F33.41 RECURRENT MAJOR DEPRESSIVE DISORDER, IN PARTIAL REMISSION (HCC): ICD-10-CM

## 2025-03-17 DIAGNOSIS — G62.9 NEUROPATHY: ICD-10-CM

## 2025-03-17 DIAGNOSIS — F41.0 GENERALIZED ANXIETY DISORDER WITH PANIC ATTACKS: ICD-10-CM

## 2025-03-17 DIAGNOSIS — F41.1 GENERALIZED ANXIETY DISORDER WITH PANIC ATTACKS: ICD-10-CM

## 2025-03-17 PROCEDURE — 90833 PSYTX W PT W E/M 30 MIN: CPT | Performed by: PSYCHIATRY & NEUROLOGY

## 2025-03-17 PROCEDURE — 3074F SYST BP LT 130 MM HG: CPT | Performed by: PSYCHIATRY & NEUROLOGY

## 2025-03-17 PROCEDURE — 3078F DIAST BP <80 MM HG: CPT | Performed by: PSYCHIATRY & NEUROLOGY

## 2025-03-17 PROCEDURE — 99214 OFFICE O/P EST MOD 30 MIN: CPT | Performed by: PSYCHIATRY & NEUROLOGY

## 2025-03-17 RX ORDER — LORAZEPAM 2 MG/1
2 TABLET ORAL 3 TIMES DAILY
Qty: 90 TABLET | Refills: 1 | Status: SHIPPED | OUTPATIENT
Start: 2025-03-17 | End: 2025-04-11 | Stop reason: SDUPTHER

## 2025-03-17 RX ORDER — PREGABALIN 200 MG/1
200 CAPSULE ORAL
Qty: 30 CAPSULE | Refills: 1 | Status: SHIPPED | OUTPATIENT
Start: 2025-03-17 | End: 2025-03-21 | Stop reason: DRUGHIGH

## 2025-03-17 RX ORDER — BUPROPION HYDROCHLORIDE 150 MG/1
450 TABLET ORAL EVERY MORNING
Qty: 30 TABLET | Refills: 5 | Status: SHIPPED | OUTPATIENT
Start: 2025-03-17 | End: 2025-04-21 | Stop reason: SDUPTHER

## 2025-03-17 RX ORDER — LISDEXAMFETAMINE DIMESYLATE 60 MG/1
1 CAPSULE ORAL EVERY MORNING
Qty: 30 CAPSULE | Refills: 0 | Status: SHIPPED | OUTPATIENT
Start: 2025-03-17 | End: 2025-04-11 | Stop reason: SDUPTHER

## 2025-03-17 NOTE — PROGRESS NOTES
[facility]  Psychiatric Follow Up    Identifying Data:  Chrissy Pritchett is a  64 y.o. white woman with past medical history of Hypothyroidism, HLD, right knee pain 2/2 OA s/p TKR (4/6/2022) & past psychiatric history of SIVAKUMAR, PTSD, and ADHD, who last presented for intake on 2/11/2025.  At that time, we discontinued Propranolol 20 mg tablet, Take 1/2 tab - 1 tab PO TID PRN, re-started Vyvanse 50 mg PO qAM, & continued Vraylar 1.5 mg PO qAM, Wellbutrin  mg PO qAM, & Lorazepam 2 mg PO QID PRN.  Additionally, due to poorly managed anxiety symptoms, Vyvanse 40 mg PO qAM was held.         History Of Present Illness:  Has been doing pretty well overall.     Overall has been really busy, and visiting in [place] every other week.  Feels that things are going well, but her stepmom's cat passed away 2 months.    Dad recently got a 9 month old kitten and hasn't been as active and only sitting with the kitten x2 weeks ago and they haven't been caring for it.      Has been feeling that anxiety has been pretty good, she and her boyfriend are doing really well.  Found that she had a little breakout in her nose because of the stress of the cat.  Last weekend, her oldest daughter     Over 1 week ago her daughters got into an argument because of her oldest daughter making a comment to her other daughter.       Her grand-daughter Mookie has been problematic.      Has been trying to stay neutral and trying to encourage her daughter to find a house.  [place] that her anxiety     Restarted the Vyvanse 50 mg and noticing that she is staying pretty focused (was finding that she was taking it off too early in the morning 7-8 AM and would wear of earlier in the afternoon), and is now taking it around 9-10 AM and feels that her anxiety is okay and not needing Lorazepam.  Notices around 4 PM feels that she gets more antsy and unsettled and will take Lorazepam.  Will go to bed around 9 PM and falls asleep quickly and will  "awaken by 4-430 AM and unable to fall back asleep.      In [place] not waking up in the middle of the night, usually around 5 PM (usually in bed)     Able to get 4-5 hours of sleep       First dose of Lorazepam usually around 3-4 PM  and then takes another before bedtime.  When waking up in the middle of the night can fall asleep more easily.       Pain has been better managed, and has been a lot more active lately.  Was getting 8,000 steps in daily.       170 lbs last night has been continuing to lose in a mre consistent manner.        Has been a little more irritable          Past Psychiatric History:  O/P Therapist:  This psychiatrist from 4/9/2019 to present.  No therapy prior.  O/P Psychiatrist:  Seen in private practice by this psychiatrist since 4/9/2019, awaiting referral and availability with this psychiatrist at [facility] with follow scheduled on 2/11/2025.  Past medication trials:    Propranolol 20 mg PO TID (1/11/2025 to 2/11/2025): ineffective for anxiety; caused sedation however not effective to help sleep quality  Focalin XR 25 mg PO qAM (12/2/2024 to 12/21/2024): started due to lack of availability of Vyvanse at her local pharmacy in an attempt to explore alternative stimulant options  Adderall XR 15 mg PO qAM (9/26/2024 to 10/26/2024): ineffective, did not do anything for her focus   Vyvanse 50 mg PO qAM:  would notice right away \"this feeling of calmness, clear-headedness\" and feeling the ability to focus better and be more mentally clear, less scattered, and less overwhelmed with her thoughts.  When taking Vyvanse  in combination with Vraylar went from taking Lorazepam 2 mg QID with first dose at awakening to Lorazepam 2 mg twice daily (in the afternoon and at bedtime).  started 20 mg on 6/8/2024, increased to 30 mg on 7/13/2024, increased to 40 mg on 8/3/2024 until 9/3/2024): each time this was discontinued or gaps in treatment were due to lack of availability at pharmacy prompting alternatives or " "patient going without, awaiting restock  Vraylar 1.5 mg PO qAM (2023 to 2024; then from 2024 to 2024; restarted 2025):  effective for managing anxiety symptoms, however currently taking it with Lyrica and concerned about difficulty losing weight which prompted discontinuing the medication. Restarted later on after discontinuing Lyrica without weight gain with significant benefit of alleviating physical agitation and anxiety symptoms, discontinued in November only due to insurance not covering medication.  Lunesta 3 mg PO qhs (2024 for a couple weeks): started after discontinuing Lyrica to help with sleep; ineffective went back to taking Lorazepam 2 mg at bedtime for sleep which helped more than Lunesta  Temazepam 30 mg PO qhs (4/3/2024 to 2024): started after discontinuing Lyrica to help with sleep; ineffective would take awhile to fall asleep & wake after a couple hours of sleep  Effexor XR 37.5 mg daily (from 19 to 19): caused insomnia, palpitations, and worsened anxiety  Duloxetine 20 mg PO qhs (from 19 to 19): caused insomnia, palpitations, \"like I wanted to just crawl out of my skin\"  Mirtazapine 15 mg PO qhs (from 19 to 19): caused anxiety and extreme sedation upon waking up in the morning  Escitalopram 5 mg PO qhs (19 to 19): just didn't like how I felt, didn't feel right  Aripiprazole 2 mg PO qhs ( 2022 - for 6 nights): caused very vivid dreaming involving her   which really freaked her out  Lyrica 600 mg PO qhs (2021 on and off until 2024):  very effective for improved sleep quality due to alleviation of neuropathic pain, however due to weight gain, tapered off  Gabapentin:  caused too much sedation in the past  Ambien: not helpful in the past for sleep  Xanax: not helpful  Previous diagnosis:  Denied  Past Psychiatric Hospitalizations:  Denied  Previous suicide attempts:   Denied  History of Self-harm " "behaviors: Denied                 Allergies:  Allergies   Allergen Reactions    [place]        Current Outpatient Medications   Medication Sig    lisdexamfetamine (VYVANSE) 50 MG capsule Take 1 Capsule by mouth every morning for 30 days. Indications: Attention Deficit Hyperactivity Disorder    cariprazine (VRAYLAR) 1.5 MG capsule Take 1 Capsule by mouth every morning.    NP THYROID 120 MG Tab Take 120 mg by mouth every day.    buPROPion (WELLBUTRIN XL) 150 MG XL tablet Take 450 mg by mouth every morning.    LORazepam (ATIVAN) 2 MG tablet Take 2 mg by mouth 4 times a day.    ASPIRIN LOW DOSE 81 MG EC tablet TAKE 1 TABLET BY MOUTH 2 TIMES A DAY       Mental Status Evaluation:   /62 (BP Location: Right arm, Patient Position: Sitting, BP Cuff Size: Adult)   Pulse 75   Wt 78.8 kg (173 lb 12.8 oz)   SpO2 96%      Motor:  No abnormal movements/EPS noted  Gait:  Ambulated without difficulty, no gait disturbance  Appearance: well-developed, well-nourished, appears younger than stated age, fair hygiene, no apparent distress, and appropriately dressed  Behavior: cooperative, engaged, friendly, pleasant, and good eye contact  Speech: regular rate, rhythm, volume, tone, and syntax  Mood: \"***\"  Affect: Full range, euthymic, congruent and appropriate to content and stated mood  Thought Process:  linear, coherent, goal-oriented, and organized  Perceptions: Denies SI, denies HI, and no overt delusions noted  Orientation: alert and oriented  Recent and Remote Memory: no gross impairment in immediate, recent, or remote memory  Attention Span and Concentration:  Intact, not formally tested based on participation in assessment  Insight/Judgement into symptoms: good  Neurological Testing (MSSE Score and/or clock drawing): MMSE not performed during this encounter    Screenings:      2/11/2025     2:00 PM 11/4/2024     1:00 PM   PHQ-9 Screening   Little interest or pleasure in doing things 0 - not at all 0 - not at all "   Feeling down, depressed, or hopeless 0 - not at all 0 - not at all   Trouble falling or staying asleep, or sleeping too much 0 - not at all    Feeling tired or having little energy 0 - not at all    Poor appetite or overeating 0 - not at all    Feeling bad about yourself - or that you are a failure or have let yourself or your family down 0 - not at all    Trouble concentrating on things, such as reading the newspaper or watching television 0 - not at all    Moving or speaking so slowly that other people could have noticed. Or the opposite - being so fidgety or restless that you have been moving around a lot more than usual 0 - not at all    Thoughts that you would be better off dead, or of hurting yourself in some way 0 - not at all    PHQ-2 Total Score 0 0   PHQ-9 Total Score 0        Interpretation of PHQ-9 Total Score   Score Severity   1-4 No Depression   5-9 Mild Depression   10-14 Moderate Depression   15-19 Moderately Severe Depression   20-27 Severe Depression         2/11/2025     3:30 PM    SIVAKUMAR-7 ANXIETY SCALE FLOWSHEET   Feeling nervous, anxious, or on edge 1   Not being able to stop or control worrying 1   Worrying too much about different things 1   Trouble relaxing 1   Being so restless that it is hard to sit still 0   Becoming easily annoyed or irritable 0   Feeling afraid as if something awful might happen 0   SIVAKUMAR-7 Total Score 4   How difficult have these problems made it for you to do your work, take care of things at home, or get along with other people? Not difficult at all       Interpretation of SIVAKUMAR-7 Total Score   Score Severity   0-4 Minimal Anxiety  5-9 Mild Anxiety   10-14 Moderate Anxiety  15-21 Severy Anxiety    Medical Records/Labs/Diagnostic Tests Reviewed:  [place] PDMP records - Reviewed.  No concerns for misuse. No concerning prescribing patterns noted.  -Disp. 2/14/2025:  Lisdexamfetamine 50 mg #30, 30-day supply, 0 RF.   -Disp. 2/12/2025:  Lorazepam 2 mg #120, 30-day supply, 0 RF.       Formulation:  Chrissy Pritchett is a  64 y.o. white woman with past medical history of Hypothyroidism, HLD, right knee pain 2/2 OA s/p TKR (4/6/2022) & past psychiatric history of SIVAKUMAR, PTSD, and ADHD, who presents self-referred for establishment of care & medication management.  She is an established patient of this psychiatrist since 4/9/2019.  She had stopped taking Vraylar 1.5 mg in Nov 2024 due to lack of insurance coverage, and since then had been experiencing worsening anxiety symptoms followed by worsening depressive symptoms, which resulted in poor tolerance of her stimulant, and need to rely more on her Lorazepam PRN.       Since restarting Vraylar 1.5 mg PO qAM to augment Wellbutrin  mg PO qAM, she has noticed improvement in depressive symptoms, anxiety, and has also been able to reduce her dosing of Lorazepam.  At this time, will attempt to re-start Vyvanse to help with overall stability of racing thoughts (secondary to symptoms of ADHD) which have contributed to exacerbated anxiety in the past.      DSM 5-TR Diagnoses:  Major depressive disorder, in partial remission  Generalized anxiety disorder  Attention-deficit hyperactivity disorder, predominantly inattentive presentation  Post-traumatic stress disorder     Pertinent Medical Diagnoses:  Hypothyroidism  Chronic pain (right knee)          Risk Assessment:  Risk Factors:   Psychiatric History and Current Status: History MH disorder; family history of psychiatric illness  Psychological Characteristics: Not applicable  Psychosocial Stressors: caregiver for her aging parents & [place] [employer]; adjustment to her new living situation after living alone for years; worry about her 2 daughters  Physical Injury or Illness: chronic medical condition; chronic pain  Protective Factors:   Psychiatric History and Status: compliance with psychiatric medication; engagement in evidenced-based treatment; motivation and readiness to change;  insight  Psychological Characteristics: Problem solving and effective coping strategies; resilience; reasons for living; future orientation  Psychosocial Factors: healthy intimate relationships; social support and community involvement  Physical Injury or Illness: Medical compliance; able to access care as needed; support for help seeking  Risk Level:         Suicide: Low       Homicide: Low       Self-Harm: Not applicable       Relapse: Not applicable       Crisis Safety Plan Reviewed Not Indicated        Medication Management:  INCREASE Vyvanse 60 mg PO qAM, for symptoms of racing thoughts and distractibility secondary to ADHD.  START Pregabalin 200 mg PO qhs, for anxiety and sleep in the context of neuropathic pain.   DECREASE Lorazepam 2 mg PO TID PRN, for breakthrough anxiety.   CONTINUE Vraylar 1.5 mg PO qAM, for treatment-resistant depression & antidepressant augmentation.  CONTINUE Wellbutrin  mg PO qAM, for depression and anxiety.      Additional Plan of Care:  Medical: Follow up as indicated with primary care provider.  Therapy: Will provide supportive psychotherapy in subsequent appointments   Themes discussed in session today:  Processed ongoing stressors, and how things have improved since last appointment, and the importance of boundary setting.   Labs: Patient recently obtained labs from her PCP will encourage her to upload to [facility].   Preventative Recommendations: discussed proper diet/nutrition, exercise, and sleep hygiene. The patient was advised that any substance use (for treating anxiety, sleep, pain, or mood), impacts efficacy of treatment.  Additionally the patient was encouraged to abstain or at the very least minimize usage of alcohol, tobacco products, and cannabis, and encouraged highly to avoid the usage of any type of illicit drug, and to use medications only as prescribed, and only for themselves.    Return to clinic in *** or sooner if symptoms worsen    The proposed treatment  plan was discussed with the patient who was provided the opportunity to ask questions and make suggestions regarding alternative treatment. Patient verbalized understanding and expressed agreement with the plan.     Total time spent on the day of encounter: *** minutes.   Total time spent in psychotherapy:  *** minutes.    Obdulia Disla M.D.  03/17/25    This note was created using voice recognition software (Dragon). The accuracy of the dictation is limited by the abilities of the software. I have reviewed the note prior to signing, however some errors in grammar and context are still possible. If you have any questions related to this note please do not hesitate to contact our office.

## 2025-03-21 ENCOUNTER — TELEPHONE (OUTPATIENT)
Dept: BEHAVIORAL HEALTH | Facility: CLINIC | Age: 65
End: 2025-03-21

## 2025-03-21 DIAGNOSIS — G62.9 NEUROPATHY: ICD-10-CM

## 2025-03-21 DIAGNOSIS — F41.1 GENERALIZED ANXIETY DISORDER WITH PANIC ATTACKS: ICD-10-CM

## 2025-03-21 DIAGNOSIS — F41.0 GENERALIZED ANXIETY DISORDER WITH PANIC ATTACKS: ICD-10-CM

## 2025-03-21 RX ORDER — PREGABALIN 100 MG/1
100 CAPSULE ORAL
Qty: 30 CAPSULE | Refills: 0 | Status: SHIPPED | OUTPATIENT
Start: 2025-03-21 | End: 2025-04-20

## 2025-03-21 NOTE — TELEPHONE ENCOUNTER
Caller Name: Chrissy  Call Back Number: [phone]     How would the patient prefer to be contacted with a response: Phone call do NOT leave a detailed message    Hello! Pt called the office to inform us she has started the Pregabalin 200mg RX yesterday and that the MG might be too strong for her.She states she had a real hard time waking up this morning felt very groggy / foggyheaded was wondering if you can prescibe the 100mg tab instead or if she should keep taking the 200mg please advise.

## 2025-04-11 ENCOUNTER — PATIENT MESSAGE (OUTPATIENT)
Dept: BEHAVIORAL HEALTH | Facility: CLINIC | Age: 65
End: 2025-04-11
Payer: COMMERCIAL

## 2025-04-11 DIAGNOSIS — F90.0 ADHD (ATTENTION DEFICIT HYPERACTIVITY DISORDER), INATTENTIVE TYPE: ICD-10-CM

## 2025-04-11 DIAGNOSIS — F41.1 GENERALIZED ANXIETY DISORDER WITH PANIC ATTACKS: ICD-10-CM

## 2025-04-11 DIAGNOSIS — F41.0 GENERALIZED ANXIETY DISORDER WITH PANIC ATTACKS: ICD-10-CM

## 2025-04-11 NOTE — PATIENT COMMUNICATION
Pt is due for both med refills can you advise thank you!      Received request via: Patient    Was the patient seen in the last year in this department? Yes    Does the patient have an active prescription (recently filled or refills available) for medication(s) requested? No    Pharmacy Name: [facility] #[id number]    Does the patient have snf Plus and need 100-day supply? (This applies to ALL medications) Patient does not have SCP

## 2025-04-15 RX ORDER — LISDEXAMFETAMINE DIMESYLATE 60 MG/1
1 CAPSULE ORAL EVERY MORNING
Qty: 30 CAPSULE | Refills: 0 | Status: SHIPPED | OUTPATIENT
Start: 2025-04-15 | End: 2025-04-21

## 2025-04-15 RX ORDER — LORAZEPAM 2 MG/1
2 TABLET ORAL 3 TIMES DAILY
Qty: 90 TABLET | Refills: 1 | Status: SHIPPED | OUTPATIENT
Start: 2025-04-15 | End: 2025-04-21 | Stop reason: DRUGHIGH

## 2025-04-21 ENCOUNTER — TELEMEDICINE (OUTPATIENT)
Dept: BEHAVIORAL HEALTH | Facility: CLINIC | Age: 65
End: 2025-04-21
Payer: COMMERCIAL

## 2025-04-21 DIAGNOSIS — F90.0 ADHD (ATTENTION DEFICIT HYPERACTIVITY DISORDER), INATTENTIVE TYPE: ICD-10-CM

## 2025-04-21 DIAGNOSIS — F41.0 GENERALIZED ANXIETY DISORDER WITH PANIC ATTACKS: ICD-10-CM

## 2025-04-21 DIAGNOSIS — F33.41 RECURRENT MAJOR DEPRESSIVE DISORDER, IN PARTIAL REMISSION (HCC): ICD-10-CM

## 2025-04-21 DIAGNOSIS — F41.1 GENERALIZED ANXIETY DISORDER WITH PANIC ATTACKS: ICD-10-CM

## 2025-04-21 PROCEDURE — 90836 PSYTX W PT W E/M 45 MIN: CPT | Mod: 95 | Performed by: PSYCHIATRY & NEUROLOGY

## 2025-04-21 PROCEDURE — 99213 OFFICE O/P EST LOW 20 MIN: CPT | Mod: 95 | Performed by: PSYCHIATRY & NEUROLOGY

## 2025-04-21 RX ORDER — LORAZEPAM 2 MG/1
2 TABLET ORAL EVERY 6 HOURS PRN
Qty: 120 TABLET | Refills: 0 | Status: SHIPPED | OUTPATIENT
Start: 2025-04-30 | End: 2025-05-30

## 2025-04-21 RX ORDER — BUPROPION HYDROCHLORIDE 150 MG/1
450 TABLET ORAL EVERY MORNING
Qty: 270 TABLET | Refills: 2 | Status: SHIPPED | OUTPATIENT
Start: 2025-04-21

## 2025-04-21 RX ORDER — DEXMETHYLPHENIDATE HYDROCHLORIDE 35 MG/1
1 CAPSULE, EXTENDED RELEASE ORAL EVERY MORNING
Qty: 30 CAPSULE | Refills: 0 | Status: SHIPPED | OUTPATIENT
Start: 2025-04-30 | End: 2025-05-30

## 2025-04-21 NOTE — PROGRESS NOTES
"[facility]  Psychiatric Follow Up  TELEMEDICINE  This evaluation was conducted via Teams using secure and encrypted videoconferencing technology. The patient was in their home in the [facility].    The patient's identity was confirmed and verbal consent was obtained for this virtual visit.    Identifying Data:    Chrissy Pritchett is a  64 y.o. white woman with past medical history of Hypothyroidism, HLD, right knee pain 2/2 OA s/p TKR (4/6/2022) & past psychiatric history of SIVAKUMAR, PTSD, and ADHD.  She was last seen on 3/17/2025, at which time we increased Vyvanse 60 mg PO qAM,  decreased Lorazepam 2 mg PO BID PRN, started Pregabalin 200 mg Po qhs, & continued Vraylar 1.5 mg PO qAM & Wellbutrin  mg PO qAM.    Interim Hx:  Patient reached out on 3/21/2025 reporting the Pregabalin 200 mg may have been too strong as it made it difficult for her to wake up in the morning, & sent Rx in for Pregabalin 100 mg PO qhs.     History Of Present Illness:  Has been \"having a really rough month,\" dealing with her father's recent alcohol use issues which stressed her out for 2 weeks (with flare ups of cold sores - lip and nostril), struggling with her grand-daughter's behaviors at home, and  unexpected conflict with her long-time best friend Hope.  States that because of her cold sore break outs, it amplified her anxiety even more and started taking Lorazepam 4 times a day again, one tablet every 3-4 hrs as a result.  Reported that when anxious she gets an \"amped\" feeling as though her body is is agitated and, feeling that she has \"too much zoom,\" and reports racing thoughts, and feeling more on edge especially when in stressful situations.  Lately, home with her daughter has been difficult, due to her behaviorally acting out, despite starting therapy because of Chrissy having to take on a disciplinarian role in the household since her daughter gets home late in the evening.  States that she has become " "even more disrespectful, and the situation upsets her not only because of the changing dynamic with her grand-daughter but because of her daughter's lack of boundaries and reinforcement of consequences.  While going through these stressors her sleep worsened, however feels that it has been gradually improving, despite taking Pregabalin only x3 nights.  Nights that she takes it, wakes up without feeling any pain.  States that she falls asleep easily around 9 PM, and wakes up 1-2 times to go to the bathroom, feeling rested.  However, if awoken in the middle of the night has a tough time falling asleep (and has been using ear plugs which have helped a lot).      Feels that she has been managing well, however will be finding a studio apartment for herself & moving out of her daughter's place, after returning from her trip to [place] in May.  Things have been going well in her relationship with Chandler and will be spending time with him on 5/2, in preparation for his hip replacement surgery on 5/5, & will be there to support him for 3 weeks post-operatively.   Has been finding that the Vyvanse helps her mood due to it helping to clear her thoughts, which don't race and scatter and feed into her anxiety, however lately has been feeling more on edge due to increased anxiety.  Worries about high co-pay cost of Vyvanse even with discount coupon.   Denies any other concerns.        Past Psychiatric History:  Past medication trials:    Propranolol 20 mg PO TID (1/11/2025 to 2/11/2025): ineffective for anxiety; caused sedation however not effective to help sleep quality  Focalin XR 25 mg PO qAM (12/2/2024 to 12/21/2024): started due to lack of availability of Vyvanse at her local pharmacy in an attempt to explore alternative stimulant options  Adderall XR 15 mg PO qAM (9/26/2024 to 10/26/2024): ineffective, did not do anything for her focus   Vyvanse 50 mg PO qAM:  would notice right away \"this feeling of calmness, clear-headedness\" " "and feeling the ability to focus better and be more mentally clear, less scattered, and less overwhelmed with her thoughts.  When taking Vyvanse  in combination with Vraylar went from taking Lorazepam 2 mg QID with first dose at awakening to Lorazepam 2 mg twice daily (in the afternoon and at bedtime).  started 20 mg on 6/8/2024, increased to 30 mg on 7/13/2024, increased to 40 mg on 8/3/2024 until 9/3/2024): each time this was discontinued or gaps in treatment were due to lack of availability at pharmacy prompting alternatives or patient going without, awaiting restock  Vraylar 1.5 mg PO qAM (2/24/2023 to 2/21/2024; then from 4/12/2024 to November 2024; restarted Feb 2025):  effective for managing anxiety symptoms, however currently taking it with Lyrica and concerned about difficulty losing weight which prompted discontinuing the medication. Restarted later on after discontinuing Lyrica without weight gain with significant benefit of alleviating physical agitation and anxiety symptoms, discontinued in November only due to insurance not covering medication.  Lunesta 3 mg PO qhs (4/12/2024 for a couple weeks): started after discontinuing Lyrica to help with sleep; ineffective went back to taking Lorazepam 2 mg at bedtime for sleep which helped more than Lunesta  Temazepam 30 mg PO qhs (4/3/2024 to 4/12/2024): started after discontinuing Lyrica to help with sleep; ineffective would take awhile to fall asleep & wake after a couple hours of sleep  Effexor XR 37.5 mg daily (from 4/11/19 to 4/27/19): caused insomnia, palpitations, and worsened anxiety  Duloxetine 20 mg PO qhs (from 5/9/19 to 5/19/19): caused insomnia, palpitations, \"like I wanted to just crawl out of my skin\"  Mirtazapine 15 mg PO qhs (from 6/6/19 to 6/13/19): caused anxiety and extreme sedation upon waking up in the morning  Escitalopram 5 mg PO qhs (6/24/19 to 6/28/19): just didn't like how I felt, didn't feel right  Aripiprazole 2 mg PO qhs ( " "2022 - for 6 nights): caused very vivid dreaming involving her   which really freaked her out  Lyrica 600 mg PO qhs (2021 on and off until 2024):  very effective for improved sleep quality due to alleviation of neuropathic pain, however due to weight gain, tapered off  Gabapentin:  caused too much sedation in the past  Ambien: not helpful in the past for sleep  Xanax: not helpful       Allergies:  Allergies   Allergen Reactions    [place]        Current Outpatient Medications   Medication Sig    Lisdexamfetamine Dimesylate 60 MG Cap Take 1 Capsule by mouth every morning for 30 days. Indications: Attention Deficit Hyperactivity Disorder    LORazepam (ATIVAN) 2 MG tablet Take 1 Tablet by mouth 3 times a day for 30 days. Indications: Feeling Anxious    buPROPion (WELLBUTRIN XL) 150 MG XL tablet Take 3 Tablets by mouth every morning.    cariprazine (VRAYLAR) 1.5 MG capsule Take 1 Capsule by mouth every morning.    NP THYROID 120 MG Tab Take 120 mg by mouth every day.    ASPIRIN LOW DOSE 81 MG EC tablet TAKE 1 TABLET BY MOUTH 2 TIMES A DAY       VITAL SIGNS:  NOT OBTAINED    Mental Status Evaluation:   Motor:No abnormal movements/EPS noted - HOWEVER EXAM LIMITED DUE TO TELEMEDICINE  Gait:  NOT ASSESSED DUE TO TELEMEDICINE.  Appearance: well-developed, well-nourished, appears younger than stated age, fair hygiene, no apparent distress, and appropriately dressed  Behavior: cooperative, engaged, friendly, pleasant, and good eye contact  Speech: regular rate, rhythm, volume, tone, and syntax  Mood: \"It's been a rough month.\"  Affect: Constricted, overall euthymic, congruent and appropriate to content and stated mood  Thought Process:  linear, coherent, goal-oriented, and organized  Perceptions: Denies SI, denies HI, and no overt delusions noted  Orientation: alert and oriented  Recent and Remote Memory: no gross impairment in immediate, recent, or remote memory  Attention Span and " Concentration:  Intact, not formally tested based on participation in assessment  Insight/Judgement into symptoms: good  Neurological Testing (MSSE Score and/or clock drawing): MMSE not performed during this encounter      Medical Records/Labs/Diagnostic Tests Reviewed:  [facility] records - Reviewed.  No concerns for misuse. No concerning prescribing patterns noted.  -Disp. 4/15/2025:  Lisdexamfetamine 50 mg #30, 30-day supply, 0 RF.   -Disp. 4/15/2025:  Lorazepam 2 mg #90, 30-day supply, 0 RF.   -Disp. 3/21/2025:  Pregabalin 100 mg #30, 30-day supply, 0 RF.   -Disp. 3/18/2025:  Pregabalin 100 mg #30, 30-day supply, 0 RF.      Formulation:  Chrissy Pritchett is a  64 y.o. white woman with past medical history of Hypothyroidism, HLD, right knee pain 2/2 OA s/p TKR (4/6/2022) & past psychiatric history of SIVAKUMAR, PTSD, and ADHD, who presents self-referred for establishment of care & medication management.  She is an established patient of this psychiatrist since 4/9/2019.  She had stopped taking Vraylar 1.5 mg in Nov 2024 due to lack of insurance coverage, and since then had been experiencing worsening anxiety symptoms followed by worsening depressive symptoms, which resulted in poor tolerance of her stimulant, and need to rely more on her Lorazepam PRN.       Since restarting Vraylar 1.5 mg PO qAM on 2/3/2025 to augment Wellbutrin  mg PO qAM, she has noticed improvement in depressive symptoms, anxiety, and has also been able to reduce her dosing of Lorazepam.  Since starting Vyvanse has found that it has helped not just for symptoms of ADHD but also for racing thoughts which has helped with alleviating anxiety symptoms.      Today, in light of increased psychosocial stressors, Chrissy reported that she had returned to increasing Lorazepam dosing to QID from BID-TID.  Additionally, due to concern about increased anxiety possibly not helped by increased dose of Vyvanse & concern for affordability of med, will  switch patient over to Focalin XR which although sub-optimal in the past, was prescribed at a lower dosing and prior to stabilization with Vraylar.   It was recommended that she try using Pregabalin more consistently on the weeks that she is in [place] to help with anxiety as well.  Additionally, due to increased stressors that she has been navigating, will increase dosing of Lorazepam, with the hope that she can begin to taper down on dosing while in [place].            DSM 5-TR Diagnoses:  Major depressive disorder, in partial remission  Generalized anxiety disorder  Attention-deficit hyperactivity disorder, predominantly inattentive presentation  Post-traumatic stress disorder     Pertinent Medical Diagnoses:  Hypothyroidism  Chronic pain (right knee)          Risk Assessment:  Risk Factors:   Psychiatric History and Current Status: History MH disorder; family history of psychiatric illness  Psychological Characteristics: Not applicable  Psychosocial Stressors: caregiver for her aging parents & commuting to [facility]; adjustment to her new living situation after living alone for years; worry about her 2 daughters  Physical Injury or Illness: chronic medical condition; chronic pain  Protective Factors:   Psychiatric History and Status: compliance with psychiatric medication; engagement in evidenced-based treatment; motivation and readiness to change; insight  Psychological Characteristics: Problem solving and effective coping strategies; resilience; reasons for living; future orientation  Psychosocial Factors: healthy intimate relationships; social support and community involvement  Physical Injury or Illness: Medical compliance; able to access care as needed; support for help seeking  Risk Level:         Suicide: Low       Homicide: Low       Self-Harm: Not applicable       Relapse: Not applicable       Crisis Safety Plan Reviewed Not Indicated        Medication Management:  DISCONTINUE Vyvanse 60 mg PO qAM, for symptoms of  racing thoughts and distractibility secondary to ADHD.  CONTINUE Pregabalin 100 mg PO qhs PRN, for anxiety and sleep in the context of neuropathic pain.   INCREASE Lorazepam 2 mg PO QID PRN, for breakthrough anxiety. Rx ordered to be dispensed on 4/30/2025, 2 mg #120, 30-day supply, 0 RF.   CONTINUE Vraylar 1.5 mg PO qAM, for treatment-resistant depression & antidepressant augmentation.  CONTINUE Wellbutrin  mg PO qAM, for depression and anxiety.      Additional Plan of Care:  Medical: Follow up as indicated with primary care provider.  Therapy: Will provide supportive psychotherapy in subsequent appointments   Themes discussed in session today:  Processed ongoing stressors, validated emotions, and coping skills for navigating ongoing family dynamics, and boundary setting.   Labs: Patient recently obtained labs from her PCP will encourage her to upload to [facility].   Preventative Recommendations: discussed proper diet/nutrition, exercise, and sleep hygiene. The patient was advised that any substance use (for treating anxiety, sleep, pain, or mood), impacts efficacy of treatment.       Return to clinic in 4 weeks or sooner if symptoms worsen    The proposed treatment plan was discussed with the patient who was provided the opportunity to ask questions and make suggestions regarding alternative treatment. Patient verbalized understanding and expressed agreement with the plan.     Total time spent on the day of encounter: 60 minutes.   Total time spent in psychotherapy:  40 minutes.    Obdulia Disla M.D.  04/21/25    This note was created using voice recognition software (Dragon). The accuracy of the dictation is limited by the abilities of the software. I have reviewed the note prior to signing, however some errors in grammar and context are still possible. If you have any questions related to this note please do not hesitate to contact our office.

## 2025-04-30 ENCOUNTER — TELEPHONE (OUTPATIENT)
Dept: BEHAVIORAL HEALTH | Facility: CLINIC | Age: 65
End: 2025-04-30

## 2025-04-30 NOTE — TELEPHONE ENCOUNTER
Caller Name: Chrissy   Call Back Number: [phone]    How would the patient prefer to be contacted with a response: Phone call OK to leave a detailed message     pt and I have been having trouble finding Dexmethylphenidate 35mg in-stock she is req.we cancel the RX and resend a lower dose of Lisdexamfetamine . She is requesting we send over the 40mg if possible

## 2025-05-02 DIAGNOSIS — F90.0 ADHD (ATTENTION DEFICIT HYPERACTIVITY DISORDER), INATTENTIVE TYPE: ICD-10-CM

## 2025-05-02 RX ORDER — LISDEXAMFETAMINE DIMESYLATE 40 MG/1
1 CAPSULE ORAL EVERY MORNING
Qty: 30 CAPSULE | Refills: 0 | Status: SHIPPED | OUTPATIENT
Start: 2025-05-02 | End: 2025-05-29 | Stop reason: RX

## 2025-05-28 DIAGNOSIS — F41.1 GENERALIZED ANXIETY DISORDER WITH PANIC ATTACKS: ICD-10-CM

## 2025-05-28 DIAGNOSIS — F41.0 GENERALIZED ANXIETY DISORDER WITH PANIC ATTACKS: ICD-10-CM

## 2025-05-28 DIAGNOSIS — F90.0 ADHD (ATTENTION DEFICIT HYPERACTIVITY DISORDER), INATTENTIVE TYPE: ICD-10-CM

## 2025-05-28 NOTE — TELEPHONE ENCOUNTER
Caller Name: Chrissy  Call Back Number: [phone]     How would the patient prefer to be contacted with a response: Phone call OK to leave a detailed message    Pt called the office updating us that she has not been able to P/U Dexmethylphenidate 35mg RX because it is in back order at every pharmacy she has attempted to reach ( over 3+ )  she is req. to be put back on Lisdexamfetamine 60mg as she is able to tolerate the dose as of now. MA informed pt she will contact [facility] to see when we can expect shipment or what would would be best option and will call pt back.          Phone Number Called: [phone] / [facility]#[id number]    Call outcome: Spoke with pharmacist     Message: Spoke with pharmacist and was informed they are expecting a medication shipment on 05/28/25. That they can attempt to fill the Dexmethyl.35mg RX  if it comes in today but they doubt it because it has been months with no success  they informed MA if it does NOT arrive they can go ahead and dispense Lisdexamfetamine 60mg RX they would just need a new prescription because they only had Lisdex. 40mg on hold for pt and pt would NOT like to  that strength but the 60mg as she is now able to tolerate.  can you send over a updated Lisdex. 60mg RX ? Thank you

## 2025-05-29 RX ORDER — LISDEXAMFETAMINE DIMESYLATE 60 MG/1
1 CAPSULE ORAL EVERY MORNING
Qty: 30 CAPSULE | Refills: 0 | Status: SHIPPED | OUTPATIENT
Start: 2025-05-29 | End: 2025-06-28

## 2025-05-29 RX ORDER — LORAZEPAM 2 MG/1
2 TABLET ORAL EVERY 6 HOURS PRN
Qty: 120 TABLET | Refills: 0 | Status: SHIPPED | OUTPATIENT
Start: 2025-05-29 | End: 2025-06-28

## 2025-06-09 ENCOUNTER — TELEMEDICINE (OUTPATIENT)
Dept: BEHAVIORAL HEALTH | Facility: CLINIC | Age: 65
End: 2025-06-09
Payer: COMMERCIAL

## 2025-06-09 DIAGNOSIS — F90.0 ADHD (ATTENTION DEFICIT HYPERACTIVITY DISORDER), INATTENTIVE TYPE: ICD-10-CM

## 2025-06-09 DIAGNOSIS — F41.0 GENERALIZED ANXIETY DISORDER WITH PANIC ATTACKS: Primary | ICD-10-CM

## 2025-06-09 DIAGNOSIS — F41.1 GENERALIZED ANXIETY DISORDER WITH PANIC ATTACKS: Primary | ICD-10-CM

## 2025-06-09 DIAGNOSIS — F33.41 RECURRENT MAJOR DEPRESSIVE DISORDER, IN PARTIAL REMISSION (HCC): ICD-10-CM

## 2025-06-09 DIAGNOSIS — G62.9 NEUROPATHY: ICD-10-CM

## 2025-06-09 PROCEDURE — 90833 PSYTX W PT W E/M 30 MIN: CPT | Mod: 95 | Performed by: PSYCHIATRY & NEUROLOGY

## 2025-06-09 PROCEDURE — 99214 OFFICE O/P EST MOD 30 MIN: CPT | Mod: 95 | Performed by: PSYCHIATRY & NEUROLOGY

## 2025-06-09 RX ORDER — DEXTROAMPHETAMINE SACCHARATE, AMPHETAMINE ASPARTATE MONOHYDRATE, DEXTROAMPHETAMINE SULFATE AND AMPHETAMINE SULFATE 6.25; 6.25; 6.25; 6.25 MG/1; MG/1; MG/1; MG/1
25 CAPSULE, EXTENDED RELEASE ORAL EVERY MORNING
Qty: 30 CAPSULE | Refills: 0 | Status: SHIPPED | OUTPATIENT
Start: 2025-06-09 | End: 2025-07-09

## 2025-06-09 RX ORDER — BUPROPION HYDROCHLORIDE 150 MG/1
450 TABLET ORAL EVERY MORNING
Qty: 270 TABLET | Refills: 2 | Status: SHIPPED | OUTPATIENT
Start: 2025-06-09

## 2025-06-09 RX ORDER — PREGABALIN 100 MG/1
100 CAPSULE ORAL
Qty: 30 CAPSULE | Refills: 2 | Status: SHIPPED | OUTPATIENT
Start: 2025-06-09 | End: 2025-07-09

## 2025-06-09 NOTE — PROGRESS NOTES
[facility]  Psychiatric Follow Up  TELEMEDICINE  This evaluation was conducted via Teams using secure and encrypted videoconferencing technology. The patient was in their home in the [facility].    The patient's identity was confirmed and verbal consent was obtained for this virtual visit.    Identifying Data:  Chrissy Pritchett is a  64 y.o. white woman with past medical history of Hypothyroidism, HLD, right knee pain 2/2 OA s/p TKR (4/6/2022) & past psychiatric history of SIVAKUMAR, PTSD, and ADHD.  She was last seen on 4/21/2025, at which time we discontinued Vyvanse 60 mg PO qAM, increased Lorazepam 2 mg PO QID PRN, & continued Pregabalin 100 mg PO qhs PRN, Vraylar 1.5 mg PO qAM, & Wellbutrin  mg PO qAM.     Interim Hx:  Due to inability to  Focalin XR, was restarted onto Vyvanse 60 mg on 5/29/2025.      History Of Present Illness:  Has been doing pretty good, however has been really busy since her boyfriend's hip replacement on May 5th, & spent extended time in [place] staying with him for 3 weeks and helping him in his post-op recovery and with chores around the house, which gave her a  lot of exercise.  Recently got back Friday night and has been feeling better about her living situation with her daughter, since her daughter was able to sell her house (which helps alleviate financial stressors) and her 2 grandchildren's behaviors have improved and it has been less chaotic.  Feels that she is closer to being able to follow through with moving out, knowing that her daughter & her kids are in a better place.  Has been feeling that her anxiety has been better managed,  taking at most only 3 tablets of Lorazepam, & reporting that it tends to heighten when spending time with her parents, due to her fathers behaviors & her stepmother's progressive dementia.   Reports that she has been having better quality sleep, states that she has been taking Pregabalin as needed, reporting that her #30  Spoke with pt and orders for U/S was generated. Pt given [facility] contact information and to call back when pt has appt to schedule a sooner appt than 7/11/22 with Dr. gomez   "pills have been able to last these past 3 months.   Reported that she \"tweaked\" her left knee which has affected her over the past 2 weeks, not able to exercise as much & impacting sleep quality.   States that she tends to sleep better when taking it at bedtime.  Has been taking Vyvanse 60 mg and tolerating it well however will notice she can \"get amped up\" when having it with her coffee which she has spaced from the time she takes her med.  Has been feeling that her thoughts have been less scattered and feels that she has been tolerating increased dose of Vyvanse well.     Past Psychiatric History:  Past medication trials:    Propranolol 20 mg PO TID (1/11/2025 to 2/11/2025): ineffective for anxiety; caused sedation however not effective to help sleep quality  Focalin XR 25 mg PO qAM (12/2/2024 to 12/21/2024): ineffective at dose; started due to lack of availability/cost of Vyvanse  Adderall XR 15 mg PO qAM (9/26/2024 to 10/26/2024): ineffective, did not do anything for her focus  Vyvanse 50 mg PO qAM:  would notice right away \"this feeling of calmness, clear-headedness\" and feeling the ability to focus better and be more mentally clear, less scattered, and less overwhelmed with her thoughts.  When taking Vyvanse  in combination with Vraylar went from taking Lorazepam 2 mg QID with first dose at awakening to Lorazepam 2 mg twice daily (in the afternoon and at bedtime).  started 20 mg on 6/8/2024, increased to 30 mg on 7/13/2024, increased to 40 mg on 8/3/2024 until 9/3/2024): each time this was discontinued or gaps in treatment were due to lack of availability at pharmacy prompting alternatives or patient going without, awaiting restock  Vraylar 1.5 mg PO qAM (2/24/2023 to 2/21/2024; then from 4/12/2024 to November 2024; restarted Feb 2025):  effective for managing anxiety symptoms, however currently taking it with Lyrica and concerned about difficulty losing weight which prompted discontinuing the medication. " "Restarted later on after discontinuing Lyrica without weight gain with significant benefit of alleviating physical agitation and anxiety symptoms, discontinued in November only due to insurance not covering medication.  Lunesta 3 mg PO qhs (2024 for a couple weeks): started after discontinuing Lyrica to help with sleep; ineffective went back to taking Lorazepam 2 mg at bedtime for sleep which helped more than Lunesta  Temazepam 30 mg PO qhs (4/3/2024 to 2024): started after discontinuing Lyrica to help with sleep; ineffective would take awhile to fall asleep & wake after a couple hours of sleep  Effexor XR 37.5 mg daily (from 19 to 19): caused insomnia, palpitations, and worsened anxiety  Duloxetine 20 mg PO qhs (from 19 to 19): caused insomnia, palpitations, \"like I wanted to just crawl out of my skin\"  Mirtazapine 15 mg PO qhs (from 19 to 19): caused anxiety and extreme sedation upon waking up in the morning  Escitalopram 5 mg PO qhs (19 to 19): just didn't like how I felt, didn't feel right  Aripiprazole 2 mg PO qhs ( 2022 - for 6 nights): caused very vivid dreaming involving her   which really freaked her out  Lyrica 600 mg PO qhs (2021 on and off until 2024):  very effective for improved sleep quality due to alleviation of neuropathic pain, however due to weight gain, tapered off  Gabapentin:  caused too much sedation in the past  Ambien: not helpful in the past for sleep  Xanax: not helpful       Allergies:  [place]    Current Outpatient Medications   Medication Sig    Lisdexamfetamine Dimesylate 60 MG Cap Take 1 Capsule by mouth every morning for 30 days. Indications: Attention Deficit Hyperactivity Disorder    LORazepam (ATIVAN) 2 MG tablet Take 1 Tablet by mouth every 6 hours as needed for Anxiety for up to 30 days. Indications: Feeling Anxious    cariprazine (VRAYLAR) 1.5 MG capsule Take 1 Capsule by mouth every morning.    " "buPROPion (WELLBUTRIN XL) 150 MG XL tablet Take 3 Tablets by mouth every morning.    NP THYROID 120 MG Tab Take 120 mg by mouth every day.    ASPIRIN LOW DOSE 81 MG EC tablet TAKE 1 TABLET BY MOUTH 2 TIMES A DAY       VITAL SIGNS:  NOT OBTAINED    Mental Status Evaluation:   Motor:  No abnormal movements/EPS noted - HOWEVER EXAM LIMITED DUE TO TELEMEDICINE  Gait:  NOT ASSESSED DUE TO TELEMEDICINE.  Appearance: well-developed, well-nourished, appears younger than stated age, fair hygiene, no apparent distress, and appropriately dressed  Behavior: cooperative, engaged, friendly, pleasant, and good eye contact  Speech: regular rate, rhythm, volume, tone, and syntax  Mood: \"Pretty good.\"  Affect: Full range, euthymic, congruent and appropriate to content and stated mood  Thought Process:  linear, coherent, goal-oriented, and organized  Perceptions: Denies SI, denies HI, and no overt delusions noted  Orientation: alert and oriented  Recent and Remote Memory: no gross impairment in immediate, recent, or remote memory  Attention Span and Concentration:  Intact, not formally tested based on participation in assessment  Insight/Judgement into symptoms: good  Neurological Testing (MSSE Score and/or clock drawing): MMSE not performed during this encounter    Medical Records/Labs/Diagnostic Tests Reviewed:  [place] PDMP records:  Reviewed.  No concerns for misuse. No concerning prescribing patterns noted.  -Disp. 5/30/2025:  Lorazepam 2 mg #120, 30-day supply, 0 RF.   -Disp. 5/29/2025:  Lisdexamfetamine 60 mg #30, 30-day supply, 0 RF.   -Disp. 5/1/2025:  Lorazepam 2 mg #120, 30-day supply, 0 RF.   -Disp. 4/15/2025:  Lorazepam 2 mg #90, 30-day supply, 0 RF.   -Disp. 3/21/2025:  Pregabalin 100 mg #30, 30-day supply, 0 RF.      Formulation:  Chrissy Pritchett is a  64 y.o. white woman with past medical history of Hypothyroidism, HLD, right knee pain 2/2 OA s/p TKR (4/6/2022) & past psychiatric history of SIVAKUMAR, PTSD, and ADHD, who " presents self-referred for establishment of care & medication management.  She is an established patient of this psychiatrist since 4/9/2019.  She had stopped taking Vraylar 1.5 mg in Nov 2024 due to lack of insurance coverage, and since then had been experiencing worsening anxiety symptoms followed by worsening depressive symptoms, which resulted in poor tolerance of her stimulant, and need to rely more on her Lorazepam PRN.       Since restarting Vraylar 1.5 mg PO qAM on 2/3/2025 to augment Wellbutrin  mg PO qAM, she has noticed improvement in depressive symptoms, anxiety, and has also been able to reduce her dosing of Lorazepam.  Since starting Vyvanse has found that it has helped not just for symptoms of ADHD but also for racing thoughts which has helped with alleviating anxiety symptoms.       Today, she reports that she has been tolerating increased dose of Vyvanse well, and reports that she has benefited from utilizing Pregabalin PRN for sleep quality in the context of neuropathic pain and anxiety.  She reports that she has been able to maintain no more than taking 3 tablets of Lorazepam 2 mg in a day, reporting an improvement in psychosocial stressors that contributed to elevated anxiety at her last appointment, therefore will reduce dosing of Lorazepam.  Additionally, due to cost of Vyvanse will try Adderall XR as possible alternative due to unavailability of Focalin XR.          DSM 5-TR Diagnoses:  Major depressive disorder, in partial remission  Generalized anxiety disorder  Attention-deficit hyperactivity disorder, predominantly inattentive presentation  Post-traumatic stress disorder     Pertinent Medical Diagnoses:  Hypothyroidism  Chronic pain (right knee)          Risk Assessment:  Risk Factors:   Psychiatric History and Current Status: History MH disorder; family history of psychiatric illness  Psychological Characteristics: Not applicable  Psychosocial Stressors: caregiver for her aging  parents & commuting to [employer]; adjustment to her new living situation after living alone for years; worry about her 2 daughters  Physical Injury or Illness: chronic medical condition; chronic pain  Protective Factors:   Psychiatric History and Status: compliance with psychiatric medication; engagement in evidenced-based treatment; motivation and readiness to change; insight  Psychological Characteristics: Problem solving and effective coping strategies; resilience; reasons for living; future orientation  Psychosocial Factors: healthy intimate relationships; social support and community involvement  Physical Injury or Illness: Medical compliance; able to access care as needed; support for help seeking  Risk Level:         Suicide: Low       Homicide: Low       Self-Harm: Not applicable       Relapse: Not applicable       Crisis Safety Plan Reviewed Not Indicated        Medication Management:  DISCONTINUE Vyvanse 60 mg PO qAM - due to cost & in favor of trying Adderall XR  START Adderall XR 25 mg PO qAM, for symptoms of racing thoughts and distractibility secondary to ADHD.  DECREASE Lorazepam 2 mg PO QID PRN, for breakthrough anxiety. NO RX NEEDED, still has adequate supply (Last Rx filled 5/30/25 #120, 30-day supply, 0 RF.   CONTINUE Pregabalin 100 mg PO qhs PRN, for anxiety and sleep in the context of neuropathic pain.   CONTINUE Vraylar 1.5 mg PO qAM, for treatment-resistant depression & antidepressant augmentation.  CONTINUE Wellbutrin  mg PO qAM, for depression and anxiety.      Additional Plan of Care:  Medical: Follow up as indicated with primary care provider.  Therapy: Will provide supportive psychotherapy in subsequent appointments   Themes discussed in session today:  Processed reported stressors from previous appointment, and her ability to cope and manage anxiety symptoms, provided supportive psychotherapy.   Labs: Patient recently obtained labs from her PCP will encourage her to upload to [email or web address].    Preventative Recommendations: discussed proper diet/nutrition, exercise, and sleep hygiene. The patient was advised that any substance use (for treating anxiety, sleep, pain, or mood), impacts efficacy of treatment.       Return to clinic in 4 weeks or sooner if symptoms worsen    The proposed treatment plan was discussed with the patient who was provided the opportunity to ask questions and make suggestions regarding alternative treatment. Patient verbalized understanding and expressed agreement with the plan.     Total time spent on the day of encounter: 30 minutes.   Total time spent in psychotherapy:  16 minutes.    Obdulia Disla M.D.  06/09/25    This note was created using voice recognition software (Dragon). The accuracy of the dictation is limited by the abilities of the software. I have reviewed the note prior to signing, however some errors in grammar and context are still possible. If you have any questions related to this note please do not hesitate to contact our office.

## 2025-06-24 ENCOUNTER — HOSPITAL ENCOUNTER (OUTPATIENT)
Facility: MEDICAL CENTER | Age: 65
End: 2025-06-24
Attending: REGISTERED NURSE
Payer: COMMERCIAL

## 2025-06-24 DIAGNOSIS — Z00.01 ABNORMAL PHYSICAL EVALUATION: ICD-10-CM

## 2025-06-24 PROCEDURE — 77063 BREAST TOMOSYNTHESIS BI: CPT

## 2025-07-09 ASSESSMENT — ANXIETY QUESTIONNAIRES
7. FEELING AFRAID AS IF SOMETHING AWFUL MIGHT HAPPEN: SEVERAL DAYS
3. WORRYING TOO MUCH ABOUT DIFFERENT THINGS: SEVERAL DAYS
4. TROUBLE RELAXING: NOT AT ALL
5. BEING SO RESTLESS THAT IT IS HARD TO SIT STILL: NOT AT ALL
IF YOU CHECKED OFF ANY PROBLEMS ON THIS QUESTIONNAIRE, HOW DIFFICULT HAVE THESE PROBLEMS MADE IT FOR YOU TO DO YOUR WORK, TAKE CARE OF THINGS AT HOME, OR GET ALONG WITH OTHER PEOPLE: NOT DIFFICULT AT ALL
2. NOT BEING ABLE TO STOP OR CONTROL WORRYING: NOT AT ALL
6. BECOMING EASILY ANNOYED OR IRRITABLE: NOT AT ALL
1. FEELING NERVOUS, ANXIOUS, OR ON EDGE: NOT AT ALL
1. FEELING NERVOUS, ANXIOUS, OR ON EDGE: NOT AT ALL
7. FEELING AFRAID AS IF SOMETHING AWFUL MIGHT HAPPEN: SEVERAL DAYS
3. WORRYING TOO MUCH ABOUT DIFFERENT THINGS: SEVERAL DAYS
IF YOU CHECKED OFF ANY PROBLEMS ON THIS QUESTIONNAIRE, HOW DIFFICULT HAVE THESE PROBLEMS MADE IT FOR YOU TO DO YOUR WORK, TAKE CARE OF THINGS AT HOME, OR GET ALONG WITH OTHER PEOPLE: NOT DIFFICULT AT ALL
6. BECOMING EASILY ANNOYED OR IRRITABLE: NOT AT ALL
4. TROUBLE RELAXING: NOT AT ALL
5. BEING SO RESTLESS THAT IT IS HARD TO SIT STILL: NOT AT ALL
2. NOT BEING ABLE TO STOP OR CONTROL WORRYING: NOT AT ALL
GAD7 TOTAL SCORE: 2

## 2025-07-09 ASSESSMENT — PATIENT HEALTH QUESTIONNAIRE - PHQ9
SUM OF ALL RESPONSES TO PHQ QUESTIONS 1-9: 1
5. POOR APPETITE OR OVEREATING: SEVERAL DAYS
2. FEELING DOWN, DEPRESSED, IRRITABLE, OR HOPELESS: 0
1. LITTLE INTEREST OR PLEASURE IN DOING THINGS: NOT AT ALL
5. POOR APPETITE OR OVEREATING: 1
9. THOUGHTS THAT YOU WOULD BE BETTER OFF DEAD, OR OF HURTING YOURSELF: NOT AT ALL
8. MOVING OR SPEAKING SO SLOWLY THAT OTHER PEOPLE COULD HAVE NOTICED. OR THE OPPOSITE, BEING SO FIGETY OR RESTLESS THAT YOU HAVE BEEN MOVING AROUND A LOT MORE THAN USUAL: 0
5. POOR APPETITE OR OVEREATING: 1 - SEVERAL DAYS
1. LITTLE INTEREST OR PLEASURE IN DOING THINGS: 0
3. TROUBLE FALLING OR STAYING ASLEEP OR SLEEPING TOO MUCH: NOT AT ALL
6. FEELING BAD ABOUT YOURSELF - OR THAT YOU ARE A FAILURE OR HAVE LET YOURSELF OR YOUR FAMILY DOWN: NOT AT ALL
7. TROUBLE CONCENTRATING ON THINGS, SUCH AS READING THE NEWSPAPER OR WATCHING TELEVISION: 0
4. FEELING TIRED OR HAVING LITTLE ENERGY: NOT AT ALL
3. TROUBLE FALLING OR STAYING ASLEEP OR SLEEPING TOO MUCH: 0
10. IF YOU CHECKED OFF ANY PROBLEMS, HOW DIFFICULT HAVE THESE PROBLEMS MADE IT FOR YOU TO DO YOUR WORK, TAKE CARE OF THINGS AT HOME, OR GET ALONG WITH OTHER PEOPLE: SOMEWHAT DIFFICULT
SUM OF ALL RESPONSES TO PHQ QUESTIONS 1-9: 1
8. MOVING OR SPEAKING SO SLOWLY THAT OTHER PEOPLE COULD HAVE NOTICED. OR THE OPPOSITE, BEING SO FIGETY OR RESTLESS THAT YOU HAVE BEEN MOVING AROUND A LOT MORE THAN USUAL: NOT AT ALL
2. FEELING DOWN, DEPRESSED, IRRITABLE, OR HOPELESS: NOT AT ALL
4. FEELING TIRED OR HAVING LITTLE ENERGY: 0
6. FEELING BAD ABOUT YOURSELF - OR THAT YOU ARE A FAILURE OR HAVE LET YOURSELF OR YOUR FAMILY DOWN: 0
9. THOUGHTS THAT YOU WOULD BE BETTER OFF DEAD, OR OF HURTING YOURSELF: 0
7. TROUBLE CONCENTRATING ON THINGS, SUCH AS READING THE NEWSPAPER OR WATCHING TELEVISION: NOT AT ALL

## 2025-07-10 ENCOUNTER — TELEMEDICINE (OUTPATIENT)
Dept: BEHAVIORAL HEALTH | Facility: CLINIC | Age: 65
End: 2025-07-10
Payer: COMMERCIAL
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
## 2025-08-05 DIAGNOSIS — F90.0 ADHD (ATTENTION DEFICIT HYPERACTIVITY DISORDER), INATTENTIVE TYPE: ICD-10-CM

## 2025-08-06 RX ORDER — DEXTROAMPHETAMINE SACCHARATE, AMPHETAMINE ASPARTATE MONOHYDRATE, DEXTROAMPHETAMINE SULFATE AND AMPHETAMINE SULFATE 7.5; 7.5; 7.5; 7.5 MG/1; MG/1; MG/1; MG/1
30 CAPSULE, EXTENDED RELEASE ORAL EVERY MORNING
Qty: 30 CAPSULE | Refills: 0 | Status: SHIPPED | OUTPATIENT
Start: 2025-08-06 | End: 2025-09-05